# Patient Record
Sex: MALE | Race: WHITE | NOT HISPANIC OR LATINO | Employment: FULL TIME | URBAN - METROPOLITAN AREA
[De-identification: names, ages, dates, MRNs, and addresses within clinical notes are randomized per-mention and may not be internally consistent; named-entity substitution may affect disease eponyms.]

---

## 2018-09-25 ENCOUNTER — OFFICE VISIT CONVERTED (OUTPATIENT)
Dept: CARDIOLOGY | Facility: CLINIC | Age: 38
End: 2018-09-25
Attending: SPECIALIST

## 2018-10-01 ENCOUNTER — CONVERSION ENCOUNTER (OUTPATIENT)
Dept: CARDIOLOGY | Facility: CLINIC | Age: 38
End: 2018-10-01
Attending: SPECIALIST

## 2019-01-08 ENCOUNTER — CONVERSION ENCOUNTER (OUTPATIENT)
Dept: CARDIOLOGY | Facility: CLINIC | Age: 39
End: 2019-01-08
Attending: SPECIALIST

## 2019-01-28 ENCOUNTER — HOSPITAL ENCOUNTER (OUTPATIENT)
Dept: OTHER | Facility: HOSPITAL | Age: 39
Discharge: HOME | End: 2019-01-28
Attending: NURSE PRACTITIONER

## 2019-01-28 ENCOUNTER — OFFICE VISIT CONVERTED (OUTPATIENT)
Dept: INTERNAL MEDICINE | Facility: CLINIC | Age: 39
End: 2019-01-28
Attending: NURSE PRACTITIONER

## 2019-01-28 LAB
ALBUMIN SERPL-MCNC: 4.2 G/DL (ref 3.5–5)
ALBUMIN/GLOB SERPL: 1.5 {RATIO} (ref 1.4–2.6)
ALP SERPL-CCNC: 70 U/L (ref 53–128)
ALT SERPL-CCNC: 30 U/L (ref 10–40)
ANION GAP SERPL CALC-SCNC: 18 MMOL/L (ref 8–19)
AST SERPL-CCNC: 20 U/L (ref 15–50)
BASOPHILS # BLD AUTO: 0.01 10*3/UL (ref 0–0.2)
BASOPHILS NFR BLD AUTO: 0.21 % (ref 0–3)
BILIRUB SERPL-MCNC: 0.4 MG/DL (ref 0.2–1.3)
BUN SERPL-MCNC: 23 MG/DL (ref 5–25)
BUN/CREAT SERPL: 18 {RATIO} (ref 6–20)
CALCIUM SERPL-MCNC: 8.9 MG/DL (ref 8.7–10.4)
CHLORIDE SERPL-SCNC: 104 MMOL/L (ref 99–111)
CHOLEST SERPL-MCNC: 151 MG/DL (ref 107–200)
CHOLEST/HDLC SERPL: 4.6 {RATIO} (ref 3–6)
CONV CO2: 23 MMOL/L (ref 22–32)
CONV TOTAL PROTEIN: 7 G/DL (ref 6.3–8.2)
CREAT UR-MCNC: 1.28 MG/DL (ref 0.7–1.2)
EOSINOPHIL # BLD AUTO: 0.14 10*3/UL (ref 0–0.7)
EOSINOPHIL # BLD AUTO: 2.44 % (ref 0–7)
ERYTHROCYTE [DISTWIDTH] IN BLOOD BY AUTOMATED COUNT: 11.6 % (ref 11.5–14.5)
GFR SERPLBLD BASED ON 1.73 SQ M-ARVRAT: >60 ML/MIN/{1.73_M2}
GLOBULIN UR ELPH-MCNC: 2.8 G/DL (ref 2–3.5)
GLUCOSE SERPL-MCNC: 109 MG/DL (ref 70–99)
HBA1C MFR BLD: 13.6 G/DL (ref 14–18)
HCT VFR BLD AUTO: 38.2 % (ref 42–52)
HDLC SERPL-MCNC: 33 MG/DL (ref 40–60)
LDLC SERPL CALC-MCNC: 64 MG/DL (ref 70–100)
LYMPHOCYTES # BLD AUTO: 2.18 10*3/UL (ref 1–5)
MCH RBC QN AUTO: 29.8 PG (ref 27–31)
MCHC RBC AUTO-ENTMCNC: 35.7 G/DL (ref 33–37)
MCV RBC AUTO: 83.4 FL (ref 80–96)
MONOCYTES # BLD AUTO: 0.45 10*3/UL (ref 0.2–1.2)
MONOCYTES NFR BLD AUTO: 7.82 % (ref 3–10)
NEUTROPHILS # BLD AUTO: 3.03 10*3/UL (ref 2–8)
NEUTROPHILS NFR BLD AUTO: 52.1 % (ref 30–85)
NRBC BLD AUTO-RTO: 0 % (ref 0–0.01)
OSMOLALITY SERPL CALC.SUM OF ELEC: 296 MOSM/KG (ref 273–304)
PLATELET # BLD AUTO: 224 10*3/UL (ref 130–400)
PMV BLD AUTO: 8.2 FL (ref 7.4–10.4)
POTASSIUM SERPL-SCNC: 3.7 MMOL/L (ref 3.5–5.3)
RBC # BLD AUTO: 4.58 10*6/UL (ref 4.7–6.1)
SODIUM SERPL-SCNC: 141 MMOL/L (ref 135–147)
TRIGL SERPL-MCNC: 272 MG/DL (ref 40–150)
VARIANT LYMPHS NFR BLD MANUAL: 37.4 % (ref 20–45)
VLDLC SERPL-MCNC: 54 MG/DL (ref 5–37)
WBC # BLD AUTO: 5.81 10*3/UL (ref 4.8–10.8)

## 2019-02-07 ENCOUNTER — HOSPITAL ENCOUNTER (OUTPATIENT)
Dept: OTHER | Facility: HOSPITAL | Age: 39
Discharge: HOME | End: 2019-02-07
Attending: NURSE PRACTITIONER

## 2019-02-07 LAB
FERRITIN SERPL-MCNC: 247 NG/ML (ref 30–300)
FOLATE SERPL-MCNC: 14 NG/ML (ref 4.8–20)
IRON SATN MFR SERPL: 21 % (ref 20–55)
IRON SERPL-MCNC: 70 UG/DL (ref 70–180)
TIBC SERPL-MCNC: 329 UG/DL (ref 245–450)
TRANSFERRIN SERPL-MCNC: 230 MG/DL (ref 215–365)
VIT B12 SERPL-MCNC: 375 PG/ML (ref 211–911)

## 2019-04-29 ENCOUNTER — OFFICE VISIT CONVERTED (OUTPATIENT)
Dept: INTERNAL MEDICINE | Facility: CLINIC | Age: 39
End: 2019-04-29
Attending: NURSE PRACTITIONER

## 2019-04-29 ENCOUNTER — HOSPITAL ENCOUNTER (OUTPATIENT)
Dept: OTHER | Facility: HOSPITAL | Age: 39
Discharge: HOME | End: 2019-04-29
Attending: NURSE PRACTITIONER

## 2019-04-29 LAB
ALBUMIN SERPL-MCNC: 4.4 G/DL (ref 3.5–5)
ALBUMIN/GLOB SERPL: 1.5 {RATIO} (ref 1.4–2.6)
ALP SERPL-CCNC: 73 U/L (ref 53–128)
ALT SERPL-CCNC: 23 U/L (ref 10–40)
ANION GAP SERPL CALC-SCNC: 19 MMOL/L (ref 8–19)
AST SERPL-CCNC: 18 U/L (ref 15–50)
BASOPHILS # BLD AUTO: 0.06 10*3/UL (ref 0–0.2)
BASOPHILS NFR BLD AUTO: 1 % (ref 0–3)
BILIRUB SERPL-MCNC: 0.26 MG/DL (ref 0.2–1.3)
BUN SERPL-MCNC: 19 MG/DL (ref 5–25)
BUN/CREAT SERPL: 15 {RATIO} (ref 6–20)
CALCIUM SERPL-MCNC: 9.3 MG/DL (ref 8.7–10.4)
CHLORIDE SERPL-SCNC: 103 MMOL/L (ref 99–111)
CONV ABS IMM GRAN: 0.05 10*3/UL (ref 0–0.2)
CONV CO2: 23 MMOL/L (ref 22–32)
CONV IMMATURE GRAN: 0.9 % (ref 0–1.8)
CONV TOTAL PROTEIN: 7.4 G/DL (ref 6.3–8.2)
CREAT UR-MCNC: 1.28 MG/DL (ref 0.7–1.2)
DEPRECATED RDW RBC AUTO: 41.5 FL (ref 35.1–43.9)
EOSINOPHIL # BLD AUTO: 0.12 10*3/UL (ref 0–0.7)
EOSINOPHIL # BLD AUTO: 2.1 % (ref 0–7)
ERYTHROCYTE [DISTWIDTH] IN BLOOD BY AUTOMATED COUNT: 13 % (ref 11.6–14.4)
GFR SERPLBLD BASED ON 1.73 SQ M-ARVRAT: >60 ML/MIN/{1.73_M2}
GLOBULIN UR ELPH-MCNC: 3 G/DL (ref 2–3.5)
GLUCOSE SERPL-MCNC: 103 MG/DL (ref 70–99)
HBA1C MFR BLD: 13.4 G/DL (ref 14–18)
HCT VFR BLD AUTO: 42.7 % (ref 42–52)
LYMPHOCYTES # BLD AUTO: 1.52 10*3/UL (ref 1–5)
MCH RBC QN AUTO: 27.7 PG (ref 27–31)
MCHC RBC AUTO-ENTMCNC: 31.4 G/DL (ref 33–37)
MCV RBC AUTO: 88.2 FL (ref 80–96)
MONOCYTES # BLD AUTO: 0.39 10*3/UL (ref 0.2–1.2)
MONOCYTES NFR BLD AUTO: 6.7 % (ref 3–10)
NEUTROPHILS # BLD AUTO: 3.69 10*3/UL (ref 2–8)
NEUTROPHILS NFR BLD AUTO: 63.2 % (ref 30–85)
NRBC CBCN: 0 % (ref 0–0.7)
OSMOLALITY SERPL CALC.SUM OF ELEC: 293 MOSM/KG (ref 273–304)
PLATELET # BLD AUTO: 256 10*3/UL (ref 130–400)
PMV BLD AUTO: 11.1 FL (ref 9.4–12.4)
POTASSIUM SERPL-SCNC: 4.9 MMOL/L (ref 3.5–5.3)
RBC # BLD AUTO: 4.84 10*6/UL (ref 4.7–6.1)
SODIUM SERPL-SCNC: 140 MMOL/L (ref 135–147)
VARIANT LYMPHS NFR BLD MANUAL: 26.1 % (ref 20–45)
WBC # BLD AUTO: 5.83 10*3/UL (ref 4.8–10.8)

## 2019-05-06 ENCOUNTER — HOSPITAL ENCOUNTER (OUTPATIENT)
Dept: MRI IMAGING | Facility: HOSPITAL | Age: 39
Discharge: HOME | End: 2019-05-06
Attending: NURSE PRACTITIONER

## 2019-05-13 ENCOUNTER — CONVERSION ENCOUNTER (OUTPATIENT)
Dept: ORTHOPEDIC SURGERY | Facility: CLINIC | Age: 39
End: 2019-05-13

## 2019-05-13 ENCOUNTER — OFFICE VISIT CONVERTED (OUTPATIENT)
Dept: ORTHOPEDIC SURGERY | Facility: CLINIC | Age: 39
End: 2019-05-13
Attending: ORTHOPAEDIC SURGERY

## 2019-05-30 ENCOUNTER — HOSPITAL ENCOUNTER (OUTPATIENT)
Dept: OTHER | Facility: HOSPITAL | Age: 39
Discharge: HOME | End: 2019-05-30

## 2019-10-30 ENCOUNTER — OFFICE VISIT CONVERTED (OUTPATIENT)
Dept: INTERNAL MEDICINE | Facility: CLINIC | Age: 39
End: 2019-10-30
Attending: NURSE PRACTITIONER

## 2019-10-30 ENCOUNTER — HOSPITAL ENCOUNTER (OUTPATIENT)
Dept: OTHER | Facility: HOSPITAL | Age: 39
Discharge: HOME | End: 2019-10-30
Attending: NURSE PRACTITIONER

## 2019-10-30 LAB
ALBUMIN SERPL-MCNC: 4.3 G/DL (ref 3.5–5)
ALBUMIN/GLOB SERPL: 1.5 {RATIO} (ref 1.4–2.6)
ALP SERPL-CCNC: 68 U/L (ref 53–128)
ALT SERPL-CCNC: 23 U/L (ref 10–40)
ANION GAP SERPL CALC-SCNC: 17 MMOL/L (ref 8–19)
AST SERPL-CCNC: 17 U/L (ref 15–50)
BASOPHILS # BLD AUTO: 0.07 10*3/UL (ref 0–0.2)
BASOPHILS NFR BLD AUTO: 1.1 % (ref 0–3)
BILIRUB SERPL-MCNC: 0.25 MG/DL (ref 0.2–1.3)
BUN SERPL-MCNC: 14 MG/DL (ref 5–25)
BUN/CREAT SERPL: 12 {RATIO} (ref 6–20)
CALCIUM SERPL-MCNC: 9.3 MG/DL (ref 8.7–10.4)
CHLORIDE SERPL-SCNC: 103 MMOL/L (ref 99–111)
CHOLEST SERPL-MCNC: 155 MG/DL (ref 107–200)
CHOLEST/HDLC SERPL: 3.7 {RATIO} (ref 3–6)
CONV ABS IMM GRAN: 0.02 10*3/UL (ref 0–0.2)
CONV CO2: 23 MMOL/L (ref 22–32)
CONV IMMATURE GRAN: 0.3 % (ref 0–1.8)
CONV TOTAL PROTEIN: 7.2 G/DL (ref 6.3–8.2)
CREAT UR-MCNC: 1.15 MG/DL (ref 0.7–1.2)
DEPRECATED RDW RBC AUTO: 38.7 FL (ref 35.1–43.9)
EOSINOPHIL # BLD AUTO: 0.13 10*3/UL (ref 0–0.7)
EOSINOPHIL # BLD AUTO: 2 % (ref 0–7)
ERYTHROCYTE [DISTWIDTH] IN BLOOD BY AUTOMATED COUNT: 12.4 % (ref 11.6–14.4)
EST. AVERAGE GLUCOSE BLD GHB EST-MCNC: 108 MG/DL
GFR SERPLBLD BASED ON 1.73 SQ M-ARVRAT: >60 ML/MIN/{1.73_M2}
GLOBULIN UR ELPH-MCNC: 2.9 G/DL (ref 2–3.5)
GLUCOSE SERPL-MCNC: 102 MG/DL (ref 70–99)
HBA1C MFR BLD: 5.4 % (ref 3.5–5.7)
HCT VFR BLD AUTO: 42.5 % (ref 42–52)
HDLC SERPL-MCNC: 42 MG/DL (ref 40–60)
HGB BLD-MCNC: 14.4 G/DL (ref 14–18)
LDLC SERPL CALC-MCNC: 81 MG/DL (ref 70–100)
LYMPHOCYTES # BLD AUTO: 1.63 10*3/UL (ref 1–5)
LYMPHOCYTES NFR BLD AUTO: 24.7 % (ref 20–45)
MCH RBC QN AUTO: 29 PG (ref 27–31)
MCHC RBC AUTO-ENTMCNC: 33.9 G/DL (ref 33–37)
MCV RBC AUTO: 85.5 FL (ref 80–96)
MONOCYTES # BLD AUTO: 0.47 10*3/UL (ref 0.2–1.2)
MONOCYTES NFR BLD AUTO: 7.1 % (ref 3–10)
NEUTROPHILS # BLD AUTO: 4.28 10*3/UL (ref 2–8)
NEUTROPHILS NFR BLD AUTO: 64.8 % (ref 30–85)
NRBC CBCN: 0 % (ref 0–0.7)
OSMOLALITY SERPL CALC.SUM OF ELEC: 289 MOSM/KG (ref 273–304)
PLATELET # BLD AUTO: 265 10*3/UL (ref 130–400)
PMV BLD AUTO: 10.8 FL (ref 9.4–12.4)
POTASSIUM SERPL-SCNC: 4.2 MMOL/L (ref 3.5–5.3)
RBC # BLD AUTO: 4.97 10*6/UL (ref 4.7–6.1)
SODIUM SERPL-SCNC: 139 MMOL/L (ref 135–147)
T4 FREE SERPL-MCNC: 1.3 NG/DL (ref 0.9–1.8)
TRIGL SERPL-MCNC: 159 MG/DL (ref 40–150)
TSH SERPL-ACNC: 1.02 M[IU]/L (ref 0.27–4.2)
VLDLC SERPL-MCNC: 32 MG/DL (ref 5–37)
WBC # BLD AUTO: 6.6 10*3/UL (ref 4.8–10.8)

## 2019-11-12 ENCOUNTER — HOSPITAL ENCOUNTER (OUTPATIENT)
Dept: ULTRASOUND IMAGING | Facility: HOSPITAL | Age: 39
Discharge: HOME | End: 2019-11-12
Attending: NURSE PRACTITIONER

## 2019-11-19 ENCOUNTER — OFFICE VISIT CONVERTED (OUTPATIENT)
Dept: PODIATRY | Facility: CLINIC | Age: 39
End: 2019-11-19
Attending: PODIATRIST

## 2019-11-25 ENCOUNTER — OFFICE VISIT CONVERTED (OUTPATIENT)
Dept: UROLOGY | Facility: CLINIC | Age: 39
End: 2019-11-25
Attending: UROLOGY

## 2019-12-17 ENCOUNTER — HOSPITAL ENCOUNTER (OUTPATIENT)
Dept: PERIOP | Facility: HOSPITAL | Age: 39
Setting detail: HOSPITAL OUTPATIENT SURGERY
Discharge: HOME | End: 2019-12-17
Attending: UROLOGY

## 2019-12-31 ENCOUNTER — CONVERSION ENCOUNTER (OUTPATIENT)
Dept: SURGERY | Facility: CLINIC | Age: 39
End: 2019-12-31

## 2019-12-31 ENCOUNTER — OFFICE VISIT CONVERTED (OUTPATIENT)
Dept: SURGERY | Facility: CLINIC | Age: 39
End: 2019-12-31
Attending: PHYSICIAN ASSISTANT

## 2020-05-08 ENCOUNTER — TELEMEDICINE CONVERTED (OUTPATIENT)
Dept: INTERNAL MEDICINE | Facility: CLINIC | Age: 40
End: 2020-05-08
Attending: NURSE PRACTITIONER

## 2020-06-08 ENCOUNTER — TELEMEDICINE CONVERTED (OUTPATIENT)
Dept: INTERNAL MEDICINE | Facility: CLINIC | Age: 40
End: 2020-06-08
Attending: NURSE PRACTITIONER

## 2020-08-10 ENCOUNTER — HOSPITAL ENCOUNTER (OUTPATIENT)
Dept: OTHER | Facility: HOSPITAL | Age: 40
Discharge: HOME | End: 2020-08-10
Attending: NURSE PRACTITIONER

## 2020-08-10 LAB — VIT B12 SERPL-MCNC: 324 PG/ML (ref 211–911)

## 2020-08-11 LAB
25(OH)D3 SERPL-MCNC: 35.1 NG/ML (ref 30–100)
TESTOST SERPL-MCNC: 281 NG/DL (ref 249–836)
TSH SERPL-ACNC: 1.29 M[IU]/L (ref 0.27–4.2)

## 2020-08-12 LAB
CONV ANTI MICROSOMAL AB: <9 IU/ML (ref 0–34)
CONV THYROXINE TOTAL: 5.9 UG/DL (ref 4.5–12)
T3FREE SERPL-MCNC: 3.2 PG/ML (ref 2–4.4)
THYROGLOBULIN ANTIBODY: <1 IU/ML (ref 0–0.9)

## 2020-08-16 LAB — TESTOSTERONE, FREE: 4.9 PG/ML (ref 6.8–21.5)

## 2020-12-11 ENCOUNTER — HOSPITAL ENCOUNTER (OUTPATIENT)
Dept: OTHER | Facility: HOSPITAL | Age: 40
Discharge: HOME | End: 2020-12-11
Attending: STUDENT IN AN ORGANIZED HEALTH CARE EDUCATION/TRAINING PROGRAM

## 2020-12-11 ENCOUNTER — OFFICE VISIT CONVERTED (OUTPATIENT)
Dept: INTERNAL MEDICINE | Facility: CLINIC | Age: 40
End: 2020-12-11
Attending: STUDENT IN AN ORGANIZED HEALTH CARE EDUCATION/TRAINING PROGRAM

## 2020-12-15 LAB — SARS-COV-2 RNA SPEC QL NAA+PROBE: NOT DETECTED

## 2021-05-13 NOTE — PROGRESS NOTES
Progress Note      Patient Name: Saul Morfin   Patient ID: [id number]   Sex: Male   YOB: 1980    Primary Care Provider: Hina ZAVALA   Referring Provider: Hnia ZAVALA    Visit Date: June 8, 2020    Provider: SALLY Sauer   Location: [facility]   Location Address: [address] [place] [address], [address]  [place], [place]  [id number]   Location Phone: [phone]          Chief Complaint  · follow up, no concerns      History Of Present Illness  Video Conferencing Visit  Saul Morfin is a 40 year old /White male who is presenting for evaluation via video conferencing via Zoom. Verbal consent obtained before beginning visit.   The following staff were present during this visit: Angela Prabhakar RN; SALLY Sauer      Informed patient that as visit is being performed as a video conference there will be no opportunity to obtain vital signs or perform a thorough physical exam. Due to this there is unfortunately a possibility that things may be missed that would typically be noticed during a traditional visit. Patient is aware of this possibility and agrees to proceed with the video conference. Patient states there is no other person present for this video conference. Call via Zoom.    Insomnia-  Follow up for addition of trazodone. Patient states sleep has improved significantly since starting the medication. Reports at least five hours of continuous sleep time, this has increased from 1-2. Denies side effects, without morning drowsiness, agitation, confusion, diarrhea. Patient would like to continue medication at this time.       Past Medical History  Disease Name Date Onset Notes   Anemia, Unspecified --  --    Anxiety --  --    Arthritis --  --    Athletes foot --  --    Depression --  --    Foot pain, left --  --    Forgetfulness --  --    Heart murmur --  --    Heel pain --  --    Hydrocele of testis, RT 10/30/2019 --    Ingrown toenail --  --    Left foot pain  10/30/2019 --    Psychiatric Care --  --    Rupture of plantar fascia of left foot, subsequent encounter 10/30/2019 --    Testicular pain, right 10/30/2019 --          Past Surgical History  Procedure Name Date Notes   Hip Surgery 2017 --    Hydrocele Repair --  --    Shoulder surgery --  --          Medication List  Name Date Started Instructions   bupropion HCl 150 mg oral tablet extended release 24 hr 10/30/2019 take 3 tablets by oral route once a day (in the morning) for 90 days   buspirone 10 mg oral tablet 10/30/2019 take 1 tablet (10 mg) by oral route 2 times per day for 90 days   prazosin 2 mg oral capsule 02/13/2020 take 1 capsule (2 mg) by oral route at bedtime   sildenafil 100 mg oral tablet 05/08/2020 take 1 tablet (100 mg) by oral route once daily as needed approximately 1 hour before sexual activity   trazodone 50 mg oral tablet 05/08/2020 take 1 tablet (50 mg) by oral route once daily at bedtime   venlafaxine 75 mg oral tablet 06/08/2020 take 1 tablet (75 mg) by oral route 2 times per day with food         Allergy List  Allergen Name Date Reaction Notes   NO KNOWN DRUG ALLERGIES --  --  --        Allergies Reconciled  Family Medical History  Disease Name Relative/Age Notes   Family history of cancer  --    Family history of diabetes mellitus  --          Social History  Finding Status Start/Stop Quantity Notes   Alcohol Current some day --/-- --  --    Alcohol Use Current some day --/-- --  occasionally drinks, less than 1 drink per day, has been drinking for 21-30 years   lives with children --  --/-- --  --    lives with spouse --  --/-- --  --    . --  --/-- --  --    Recreational Drug Use Former --/-- --  in the past   Tobacco Never --/-- --  never smoker   Working --  --/-- --  --          Immunizations  NameDate Admin Mfg Trade Name Lot Number Route Inj VIS Given VIS Publication   [id number]/01/2019 SKB Fluarix, quadrivalent, preservative free [id number] NE NE 10/30/2019    Comments:           Review of Systems  · Constitutional  o Denies  o : fever, fatigue, weight loss, weight gain  · Cardiovascular  o Denies  o : lower extremity edema, chest pressure, palpitations  · Respiratory  o Denies  o : shortness of breath, wheezing, cough, dyspnea on exertion  · Gastrointestinal  o Denies  o : nausea, vomiting, diarrhea, constipation, abdominal pain  · Psychiatric  o Admits  o : anxiety, depression, difficulty sleeping  o Denies  o : suicidal ideation, homicidal ideation      Physical Examination     General: Well nourished, no acute distress  HENT: Atraumatic, normocephalic  Eyes: Extraocular movements intact, no scleral icterus  Lungs: Breathing comfortably, without cough  Integumentary: No visible rash or lesion  Neurologic: Grossly oriented to person, place, time; without facial droop  Psych: Normal mood and affect               Assessment  · Insomnia     780.52/G47.00  Well controlled, continue trazodone. Patient to continue to monitor for potential side effects, will call or return to clinic with concerns. Discussed potential for serotonin syndrome in combination with other medications, patient voices understanding. Follow up in 6 months, sooner if concerns arise.    Problems Reconciled  Plan  · Orders  o ACO-39: Current medications updated and reviewed () - - 06/08/2020  · Medications  o trazodone 50 mg oral tablet   SIG: take 1 tablet (50 mg) by oral route once daily at bedtime   DISP: (90) tablets with 1 refills  Adjusted on 06/08/2020     o Medications have been Reconciled  o Transition of Care or Provider Policy  · Instructions  o Take all medications as prescribed/directed.  o Patient was educated/instructed on their diagnosis, treatment and medications prior to discharge from the clinic today.  o Patient instructed to seek medical attention urgently for new or worsening symptoms.  o Call the office with any concerns or questions.  o Risks, benefits, and alternatives were discussed with  the patient. The patient is aware of risks associated with: serotonin syndrome  · Disposition  o Call or Return if symptoms worsen or persist.  o Follow up in 6 months  o Prescriptions sent to pharmacy            Electronically Signed by: SALLY Sauer -Author on June 8, 2020 11:09:49 AM

## 2021-05-13 NOTE — PROGRESS NOTES
"   Progress Note      Patient Name: Saul Morfin   Patient ID: [id number]   Sex: Male   YOB: 1980    Primary Care Provider: Hina ZAVALA   Referring Provider: Hina ZAVALA    Visit Date: December 11, 2020    Provider: Domonique Hunt MD   Location: [facility]   Location Address: [address] [place] [address] [address]  [place], [place]  [id number]   Location Phone: [phone]          Chief Complaint  · \"headache,body aches, very tired,nasuea\"      History Of Present Illness  Saul Morfin is a 40 year old /White male who presents for evaluation and treatment of:      Fatigue:   Abrupt onset a couple days ago.   Endorses cough, non-productive as well as nausea and body aches.   States he felt like he had the flu and desires testing for Covid.   Denies diarrhea, fever or chills.       Past Medical History  Disease Name Date Onset Notes   Anemia, Unspecified --  --    Anxiety --  --    Arthritis --  --    Athletes foot --  --    Depression --  --    Foot pain, left --  --    Forgetfulness --  --    Heart murmur --  --    Heel pain --  --    Hydrocele of testis, RT 10/30/2019 --    Ingrown toenail --  --    Left foot pain 10/30/2019 --    Psychiatric Care --  --    Rupture of plantar fascia of left foot, subsequent encounter 10/30/2019 --    Testicular pain, right 10/30/2019 --          Past Surgical History  Procedure Name Date Notes   Hip Surgery 2017 --    Hydrocele Repair --  --    Shoulder surgery --  --          Medication List  Name Date Started Instructions   bupropion HCl 150 mg oral tablet extended release 24 hr 10/30/2019 take 3 tablets by oral route once a day (in the morning) for 90 days   buspirone 10 mg oral tablet 10/30/2019 take 1 tablet (10 mg) by oral route 2 times per day for 90 days   prazosin 2 mg oral capsule 02/13/2020 take 1 capsule (2 mg) by oral route at bedtime   sildenafil 100 mg oral tablet 05/08/2020 take 1 tablet (100 mg) by oral route once " "daily as needed approximately 1 hour before sexual activity   trazodone 50 mg oral tablet 06/08/2020 take 1 tablet (50 mg) by oral route once daily at bedtime   venlafaxine 75 mg oral tablet 06/08/2020 take 1 tablet (75 mg) by oral route 2 times per day with food   Vitamin D3 50 mcg (2,000 unit) oral tablet 08/19/2020 take 1 tablet by oral route once a day (in the morning)         Allergy List  Allergen Name Date Reaction Notes   NO KNOWN DRUG ALLERGIES --  --  --        Allergies Reconciled  Family Medical History  Disease Name Relative/Age Notes   Family history of cancer  --    Family history of diabetes mellitus  --          Social History  Finding Status Start/Stop Quantity Notes   Alcohol Current some day --/-- --  --    Alcohol Use Current some day --/-- --  occasionally drinks, less than 1 drink per day, has been drinking for 21-30 years   lives with children --  --/-- --  --    lives with spouse --  --/-- --  --    . --  --/-- --  --    Recreational Drug Use Former --/-- --  in the past   Tobacco Never --/-- --  never smoker   Working --  --/-- --  --          Immunizations  NameDate Admin Mfg Trade Name Lot Number Route Inj VIS Given VIS Publication   [id number]/01/2019 SKB Fluarix, quadrivalent, preservative free [id number] NE NE 10/30/2019    Comments:          Review of Systems  · Constitutional  o Denies  o : fever, fatigue, weight loss, weight gain  · Cardiovascular  o Denies  o : lower extremity edema, claudication, chest pressure, palpitations  · Respiratory  o Admits  o : dry cough  o Denies  o : shortness of breath  · Gastrointestinal  o Admits  o : nausea  o Denies  o : vomiting, diarrhea, constipation, abdominal pain  · Musculoskeletal  o Denies  o : muscle aches      Vitals  Date Time BP Position Site L\R Cuff Size HR RR TEMP (F) WT  HT  BMI kg/m2 BSA m2 O2 Sat FR L/min FiO2 HC       11/25/2019 09:29 AM       16  261lbs 8oz 5'  11\" 36.47 2.44       12/31/2019 09:06 AM       16  253lbs 2oz 5' " " 11\" 35.3 2.4       12/11/2020 11:58 /70 Sitting    82 - R  97.9 293lbs 16oz 5'  11\" 41 2.58 95 %            Physical Examination  · Constitutional  o Appearance  o : no acute distress, well-nourished  · Head and Face  o Head  o :   § Inspection  § : atraumatic, normocephalic  · Ears, Nose, Mouth and Throat  o Ears  o :   § External Ears  § : normal  o Nose  o :   § Intranasal Exam  § : nares patent  o Oral Cavity  o :   § Oral Mucosa  § : moist mucous membranes  o Throat  o :   § Oropharynx  § : no inflammation or lesions present, tonsils within normal limits  · Respiratory  o Respiratory Effort  o : breathing comfortably, symmetric chest rise  o Auscultation of Lungs  o : clear to asculatation bilaterally, no wheezes, rales, or rhonchii  · Cardiovascular  o Heart  o :   § Auscultation of Heart  § : regular rate and rhythm, no murmurs, rubs, or gallops  o Peripheral Vascular System  o :   § Extremities  § : no edema          Assessment  · Fatigue     780.79/R53.83  With mild cough and body aches. Covid testing in office, will call pt with result. Recommend self-quarantine until results are obtained.  · Body aches     780.96/R52    Problems Reconciled  Plan  · Orders  o ACO-39: Current medications updated and reviewed (1159F, ) - 780.79/R53.83, 780.96/R52 - 12/11/2020  o [facility] NCOV2 (send-out) (59629) - 780.79/R53.83 - 12/11/2020  · Medications  o Medications have been Reconciled  o Transition of Care or Provider Policy  · Instructions  o Patient was educated/instructed on their diagnosis, treatment and medications prior to discharge from the clinic today.  o Discussed Covid-19 precautions including, but not limited to, social distancing, avoid touching your face, and hand washing.   · Disposition  o Call or Return if symptoms worsen or persist.            Electronically Signed by: Domonique Hunt MD -Author on February 15, 2021 07:55:43 PM  "

## 2021-05-13 NOTE — PROGRESS NOTES
Progress Note      Patient Name: Saul Morfin   Patient ID: [id number]   Sex: Male   YOB: 1980    Primary Care Provider: Hina ZAVALA   Referring Provider: Hina ZAVALA    Visit Date: May 8, 2020    Provider: SALLY Sauer   Location: [facility]   Location Address: [address] [place] [address], [address]  [place], [place]  [id number]   Location Phone: [phone]          Chief Complaint  · follow up  · trouble sleeping      History Of Present Illness  Video Conferencing Visit  Saul Morfin is a 39 year old /White male who is presenting for evaluation via video conferencing. Verbal consent obtained before beginning visit.   The following staff were present during this visit: Domonique Loyd MA; SALLY Sauer      Informed patient that as visit is being performed as a video conference there will be no opportunity to obtain vital signs or perform a thorough physical exam. Due to this there is unfortunately a possibility that things may be missed that would typically be noticed during a traditional visit. Patient is aware of this possibility and agrees to proceed with the video conference. Patient states there is no other person present for this video conference. Call via Zoom.    Anxiety/depression-  Currently managed with venlafaxine, Wellbutrin, buspirone. Patient reports symptoms well controlled, without concern at this time. He does not currently see a therapist. Denies SI/HI.    Insomnia-  Patient reports difficulty staying asleep. Will fall asleep without issue but only sleep for 1-3 hours at a time. Patient currently wears a CPAP, states this did improve his sleep. Patient was on Ambien in the past which was helpful. Has also tried melatonin and Benadryl without relief.         Past Medical History  Disease Name Date Onset Notes   Anemia, Unspecified --  --    Anxiety --  --    Arthritis --  --    Athletes foot --  --    Depression --  --    Foot pain, left  --  --    Forgetfulness --  --    Heart murmur --  --    Heel pain --  --    Hydrocele of testis, RT 10/30/2019 --    Ingrown toenail --  --    Left foot pain 10/30/2019 --    Psychiatric Care --  --    Rupture of plantar fascia of left foot, subsequent encounter 10/30/2019 --    Testicular pain, right 10/30/2019 --          Past Surgical History  Procedure Name Date Notes   Hip Surgery 2017 --    Hydrocele Repair --  --    Shoulder surgery --  --          Medication List  Name Date Started Instructions   bupropion HCl 150 mg oral tablet extended release 24 hr 10/30/2019 take 3 tablets by oral route once a day (in the morning) for 90 days   buspirone 10 mg oral tablet 10/30/2019 take 1 tablet (10 mg) by oral route 2 times per day for 90 days   naproxen 500 mg oral tablet 01/28/2019 take 1 tablet (500 mg) by oral route 2 times per day with food for 30 days   prazosin 2 mg oral capsule 02/13/2020 take 1 capsule (2 mg) by oral route at bedtime   sildenafil 100 mg oral tablet 05/08/2020 take 1 tablet (100 mg) by oral route once daily as needed approximately 1 hour before sexual activity   venlafaxine 37.5 mg oral capsule,extended release 24hr 10/30/2019 take 1 capsule (37.5 mg) by oral route once daily with food         Allergy List  Allergen Name Date Reaction Notes   NO KNOWN DRUG ALLERGIES --  --  --          Family Medical History  Disease Name Relative/Age Notes   Family history of cancer  --    Family history of diabetes mellitus  --          Social History  Finding Status Start/Stop Quantity Notes   Alcohol Current some day --/-- --  --    Alcohol Use Current some day --/-- --  occasionally drinks, less than 1 drink per day, has been drinking for 21-30 years   lives with children --  --/-- --  --    lives with spouse --  --/-- --  --    . --  --/-- --  --    Recreational Drug Use Former --/-- --  in the past   Tobacco Never --/-- --  never smoker   Working --  --/-- --  --          Immunizations  NameDate  Admin Mfg Trade Name Lot Number Route Inj VIS Given VIS Publication   [id number]/01/2019 SKB Fluarix, quadrivalent, preservative free [id number] NE NE 10/30/2019    Comments:          Review of Systems  · Constitutional  o Denies  o : fever, fatigue, weight loss, weight gain  · Cardiovascular  o Denies  o : lower extremity edema, chest pressure, palpitations  · Respiratory  o Denies  o : shortness of breath, wheezing, frequent cough, dyspnea on exertion  · Gastrointestinal  o Denies  o : nausea, vomiting, diarrhea, constipation, abdominal pain  · Psychiatric  o Admits  o : anxiety, depression, difficulty sleeping  o Denies  o : suicidal ideation, homicidal ideation      Physical Examination     General: Well nourished, no acute distress  HENT: Atraumatic, normocephalic  Eyes: Extraocular movements intact, no scleral icterus  Lungs: Breathing comfortably, without cough  Integumentary: No visible rash or lesion  Neurologic: Grossly oriented to person, place, time; without facial droop  Psych: Normal mood and affect               Assessment  · Depression     311/F32.9  · Anxiety     300.02/F41.1  Anxiety/depression well controlled per patient. Continue Effexor, Wellbutrin, Buspar. Encouraged patient to continue to monitor and to seek medical attention immediately if he feels that his mental health is deteriorating. Denies SI/HI. Will continue to monitor.  · Insomnia     780.52/G47.00  Discussed sleep hygiene interventions, including developing a bedtime routine, avoiding sources of blue light prior to bedtime, avoiding daytime naps, reducing caffeine intake, journaling thoughts and using the bed only for sleep. Will trial low dose trazodone at this time and follow up in one month to assess medication effectiveness, sooner if concerns arise. Monitor for signs of serotonin syndrome due to combination use with SSRI. Patient aware of black box warning of increased risk for suicidal ideations.     Problems  Reconciled  Plan  · Orders  o ACO-39: Current medications updated and reviewed () - - 05/08/2020  · Medications  o trazodone 50 mg oral tablet   SIG: take 1 tablet (50 mg) by oral route once daily at bedtime   DISP: (30) tablets with 1 refills  Prescribed on 05/08/2020     o sildenafil 100 mg oral tablet   SIG: take 1 tablet (100 mg) by oral route once daily as needed approximately 1 hour before sexual activity   DISP: (9) tablets with 1 refills  Refilled on 05/08/2020     o Medications have been Reconciled  o Transition of Care or Provider Policy  · Instructions  o Take all medications as prescribed/directed.  o Patient was educated/instructed on their diagnosis, treatment and medications prior to discharge from the clinic today.  o Patient instructed to seek medical attention urgently for new or worsening symptoms.  o Call the office with any concerns or questions.  o Risks, benefits, and alternatives were discussed with the patient. The patient is aware of risks associated with: trazodone, SSRIs, serotonin syndrome, increased risk of suicidal ideations  · Disposition  o Call or Return if symptoms worsen or persist.  o Follow up in 1 month  o Prescriptions sent to pharmacy            Electronically Signed by: SALLY Sauer -Author on May 8, 2020 12:03:50 PM

## 2021-05-14 VITALS
OXYGEN SATURATION: 95 % | HEIGHT: 71 IN | BODY MASS INDEX: 41.16 KG/M2 | HEART RATE: 82 BPM | DIASTOLIC BLOOD PRESSURE: 70 MMHG | SYSTOLIC BLOOD PRESSURE: 120 MMHG | WEIGHT: 294 LBS | TEMPERATURE: 97.9 F

## 2021-05-15 VITALS
BODY MASS INDEX: 36.82 KG/M2 | OXYGEN SATURATION: 98 % | TEMPERATURE: 98.1 F | HEART RATE: 86 BPM | WEIGHT: 263 LBS | RESPIRATION RATE: 14 BRPM | SYSTOLIC BLOOD PRESSURE: 116 MMHG | DIASTOLIC BLOOD PRESSURE: 70 MMHG | HEIGHT: 71 IN

## 2021-05-15 VITALS
OXYGEN SATURATION: 98 % | TEMPERATURE: 96.7 F | BODY MASS INDEX: 37.69 KG/M2 | DIASTOLIC BLOOD PRESSURE: 78 MMHG | RESPIRATION RATE: 16 BRPM | WEIGHT: 269.25 LBS | HEART RATE: 67 BPM | SYSTOLIC BLOOD PRESSURE: 128 MMHG | HEIGHT: 71 IN

## 2021-05-15 VITALS
DIASTOLIC BLOOD PRESSURE: 80 MMHG | HEIGHT: 71 IN | OXYGEN SATURATION: 97 % | BODY MASS INDEX: 36.96 KG/M2 | WEIGHT: 264 LBS | HEART RATE: 71 BPM | SYSTOLIC BLOOD PRESSURE: 147 MMHG

## 2021-05-15 VITALS — HEIGHT: 71 IN | RESPIRATION RATE: 16 BRPM | BODY MASS INDEX: 36.61 KG/M2 | WEIGHT: 261.5 LBS

## 2021-05-15 VITALS — WEIGHT: 253.12 LBS | BODY MASS INDEX: 35.44 KG/M2 | RESPIRATION RATE: 16 BRPM | HEIGHT: 71 IN

## 2021-05-15 VITALS
RESPIRATION RATE: 15 BRPM | HEART RATE: 84 BPM | OXYGEN SATURATION: 96 % | SYSTOLIC BLOOD PRESSURE: 118 MMHG | HEIGHT: 71 IN | BODY MASS INDEX: 39.2 KG/M2 | TEMPERATURE: 97.5 F | DIASTOLIC BLOOD PRESSURE: 64 MMHG | WEIGHT: 280 LBS

## 2021-05-15 VITALS — WEIGHT: 259.5 LBS | OXYGEN SATURATION: 99 % | BODY MASS INDEX: 36.33 KG/M2 | HEART RATE: 87 BPM | HEIGHT: 71 IN

## 2021-05-16 VITALS
HEIGHT: 71 IN | BODY MASS INDEX: 37.66 KG/M2 | DIASTOLIC BLOOD PRESSURE: 90 MMHG | HEART RATE: 88 BPM | SYSTOLIC BLOOD PRESSURE: 148 MMHG | WEIGHT: 269 LBS

## 2021-06-23 DIAGNOSIS — F41.9 ANXIETY: Primary | ICD-10-CM

## 2021-06-23 DIAGNOSIS — F32.A DEPRESSION, UNSPECIFIED DEPRESSION TYPE: ICD-10-CM

## 2021-06-23 RX ORDER — VENLAFAXINE 75 MG/1
75 TABLET ORAL DAILY
COMMUNITY
Start: 2021-03-08 | End: 2021-06-23 | Stop reason: SDUPTHER

## 2021-06-23 NOTE — TELEPHONE ENCOUNTER
Last Seen:  12/11/2020  Needing a refill on:    Buproprion 150mg 3 tablets Qday  Venlafaxine 75mg  Buspirone 10mg  Vitamin D3 50mcg (2,000 Unit)    Didn't order buproprion 3 tablets daily wasn't sure if correct and that dosing.  Hasn't had it filled since 2019 from [facility]    Buspirone hasn't been filled since 2019 either.     [facility] patient.

## 2021-06-23 NOTE — TELEPHONE ENCOUNTER
Can we find out if she was getting the buproprion prescribed by someone else? Or maybe she had enough refills to last her through last year?

## 2021-06-24 PROBLEM — R01.1 HEART MURMUR: Status: ACTIVE | Noted: 2021-06-24

## 2021-06-24 PROBLEM — S93.692A RUPTURE OF PLANTAR FASCIA OF LEFT FOOT: Status: ACTIVE | Noted: 2019-10-30

## 2021-06-24 PROBLEM — Z86.59 PERSONAL HISTORY OF MENTAL DISORDER: Status: ACTIVE | Noted: 2021-06-24

## 2021-06-24 PROBLEM — N50.819 PAIN IN TESTICLE: Status: ACTIVE | Noted: 2019-10-30

## 2021-06-24 PROBLEM — F41.9 ANXIETY: Status: ACTIVE | Noted: 2021-06-24

## 2021-06-24 PROBLEM — B35.3 ATHLETES FOOT: Status: ACTIVE | Noted: 2021-06-24

## 2021-06-24 PROBLEM — L60.0 INGROWN TOENAIL: Status: ACTIVE | Noted: 2021-06-24

## 2021-06-24 PROBLEM — M79.673 PAIN OF FOOT: Status: ACTIVE | Noted: 2019-10-30

## 2021-06-24 PROBLEM — F32.A DEPRESSION: Status: ACTIVE | Noted: 2021-06-24

## 2021-06-24 PROBLEM — D64.9 ANEMIA: Status: ACTIVE | Noted: 2021-06-24

## 2021-06-24 PROBLEM — N43.3 HYDROCELE OF TESTIS: Status: ACTIVE | Noted: 2019-10-30

## 2021-06-24 PROBLEM — M19.90 ARTHRITIS: Status: ACTIVE | Noted: 2021-06-24

## 2021-06-24 PROBLEM — R68.89 FORGETFULNESS: Status: ACTIVE | Noted: 2021-06-24

## 2021-06-24 RX ORDER — CHOLECALCIFEROL (VITAMIN D3) 125 MCG
2000 CAPSULE ORAL DAILY
Qty: 30 TABLET | Refills: 3 | Status: SHIPPED | OUTPATIENT
Start: 2021-06-24 | End: 2021-11-02 | Stop reason: SDUPTHER

## 2021-06-24 RX ORDER — BUPROPION HYDROCHLORIDE 150 MG/1
150 TABLET, EXTENDED RELEASE ORAL 3 TIMES DAILY
Qty: 90 TABLET | Refills: 3 | Status: SHIPPED | OUTPATIENT
Start: 2021-06-24 | End: 2021-11-02 | Stop reason: SDUPTHER

## 2021-06-24 RX ORDER — SILDENAFIL 100 MG/1
TABLET, FILM COATED ORAL
COMMUNITY
Start: 2021-03-08 | End: 2022-07-19 | Stop reason: SDUPTHER

## 2021-06-24 RX ORDER — TRAZODONE HYDROCHLORIDE 50 MG/1
TABLET ORAL
COMMUNITY
Start: 2021-03-08 | End: 2021-06-28 | Stop reason: SDUPTHER

## 2021-06-24 RX ORDER — VENLAFAXINE 75 MG/1
75 TABLET ORAL 2 TIMES DAILY
Qty: 180 TABLET | Refills: 0 | Status: SHIPPED | OUTPATIENT
Start: 2021-06-24 | End: 2021-06-28 | Stop reason: SDUPTHER

## 2021-06-24 RX ORDER — BUSPIRONE HYDROCHLORIDE 10 MG/1
10 TABLET ORAL 2 TIMES DAILY
Qty: 60 TABLET | Refills: 2 | Status: SHIPPED | OUTPATIENT
Start: 2021-06-24 | End: 2021-11-02 | Stop reason: SDUPTHER

## 2021-06-28 DIAGNOSIS — F41.9 ANXIETY: ICD-10-CM

## 2021-06-28 DIAGNOSIS — F32.A DEPRESSION, UNSPECIFIED DEPRESSION TYPE: ICD-10-CM

## 2021-06-28 RX ORDER — TRAZODONE HYDROCHLORIDE 50 MG/1
50 TABLET ORAL NIGHTLY
Qty: 22 TABLET | Refills: 0 | Status: SHIPPED | OUTPATIENT
Start: 2021-06-28 | End: 2021-08-17

## 2021-06-28 RX ORDER — BUPROPION HYDROCHLORIDE 150 MG/1
150 TABLET, EXTENDED RELEASE ORAL 3 TIMES DAILY
Qty: 90 TABLET | Refills: 3 | OUTPATIENT
Start: 2021-06-28 | End: 2021-07-28

## 2021-06-28 RX ORDER — VENLAFAXINE 75 MG/1
75 TABLET ORAL 2 TIMES DAILY
Qty: 44 TABLET | Refills: 0 | Status: SHIPPED | OUTPATIENT
Start: 2021-06-28 | End: 2021-08-09 | Stop reason: SDUPTHER

## 2021-06-28 RX ORDER — BUSPIRONE HYDROCHLORIDE 10 MG/1
10 TABLET ORAL 2 TIMES DAILY
Qty: 60 TABLET | Refills: 2 | OUTPATIENT
Start: 2021-06-28 | End: 2021-07-28

## 2021-07-19 ENCOUNTER — OFFICE VISIT (OUTPATIENT)
Dept: INTERNAL MEDICINE | Facility: CLINIC | Age: 41
End: 2021-07-19

## 2021-07-19 VITALS
TEMPERATURE: 97.4 F | DIASTOLIC BLOOD PRESSURE: 68 MMHG | SYSTOLIC BLOOD PRESSURE: 130 MMHG | HEIGHT: 71 IN | OXYGEN SATURATION: 97 % | HEART RATE: 72 BPM | WEIGHT: 291.4 LBS | BODY MASS INDEX: 40.8 KG/M2

## 2021-07-19 DIAGNOSIS — E55.9 VITAMIN D DEFICIENCY: ICD-10-CM

## 2021-07-19 DIAGNOSIS — N28.9 ABNORMAL KIDNEY FUNCTION: ICD-10-CM

## 2021-07-19 DIAGNOSIS — R03.0 ELEVATED BLOOD PRESSURE READING WITHOUT DIAGNOSIS OF HYPERTENSION: ICD-10-CM

## 2021-07-19 DIAGNOSIS — Z13.220 SCREENING FOR CHOLESTEROL LEVEL: ICD-10-CM

## 2021-07-19 DIAGNOSIS — F33.1 MAJOR DEPRESSIVE DISORDER, RECURRENT EPISODE, MODERATE DEGREE (HCC): Primary | ICD-10-CM

## 2021-07-19 DIAGNOSIS — Z13.29 SCREENING FOR THYROID DISORDER: ICD-10-CM

## 2021-07-19 DIAGNOSIS — F41.1 GENERALIZED ANXIETY DISORDER: ICD-10-CM

## 2021-07-19 DIAGNOSIS — F31.0 BIPOLAR AFFECTIVE DISORDER, CURRENT EPISODE HYPOMANIC (HCC): ICD-10-CM

## 2021-07-19 DIAGNOSIS — R73.9 HYPERGLYCEMIA: ICD-10-CM

## 2021-07-19 DIAGNOSIS — E53.8 B12 DEFICIENCY: ICD-10-CM

## 2021-07-19 PROBLEM — F32.A DEPRESSION: Status: RESOLVED | Noted: 2021-06-24 | Resolved: 2021-07-19

## 2021-07-19 PROBLEM — S93.692A RUPTURE OF PLANTAR FASCIA OF LEFT FOOT: Status: RESOLVED | Noted: 2019-10-30 | Resolved: 2021-07-19

## 2021-07-19 PROBLEM — M79.673 PAIN OF FOOT: Status: RESOLVED | Noted: 2019-10-30 | Resolved: 2021-07-19

## 2021-07-19 PROBLEM — F41.9 ANXIETY: Status: RESOLVED | Noted: 2021-06-24 | Resolved: 2021-07-19

## 2021-07-19 PROBLEM — N43.3 HYDROCELE OF TESTIS: Status: RESOLVED | Noted: 2019-10-30 | Resolved: 2021-07-19

## 2021-07-19 PROBLEM — R68.89 FORGETFULNESS: Status: RESOLVED | Noted: 2021-06-24 | Resolved: 2021-07-19

## 2021-07-19 PROBLEM — N50.819 PAIN IN TESTICLE: Status: RESOLVED | Noted: 2019-10-30 | Resolved: 2021-07-19

## 2021-07-19 PROBLEM — Z86.59 PERSONAL HISTORY OF MENTAL DISORDER: Status: RESOLVED | Noted: 2021-06-24 | Resolved: 2021-07-19

## 2021-07-19 LAB
25(OH)D3 SERPL-MCNC: 43.1 NG/ML (ref 30–100)
ALBUMIN SERPL-MCNC: 4.4 G/DL (ref 3.5–5.2)
ALBUMIN/GLOB SERPL: 1.6 G/DL
ALP SERPL-CCNC: 64 U/L (ref 39–117)
ALT SERPL W P-5'-P-CCNC: 25 U/L (ref 1–41)
ANION GAP SERPL CALCULATED.3IONS-SCNC: 9.4 MMOL/L (ref 5–15)
AST SERPL-CCNC: 20 U/L (ref 1–40)
BASOPHILS # BLD AUTO: 0.06 10*3/MM3 (ref 0–0.2)
BASOPHILS NFR BLD AUTO: 1 % (ref 0–1.5)
BILIRUB SERPL-MCNC: 0.4 MG/DL (ref 0–1.2)
BUN SERPL-MCNC: 25 MG/DL (ref 6–20)
BUN/CREAT SERPL: 14.5 (ref 7–25)
CALCIUM SPEC-SCNC: 9 MG/DL (ref 8.6–10.5)
CHLORIDE SERPL-SCNC: 104 MMOL/L (ref 98–107)
CHOLEST SERPL-MCNC: 165 MG/DL (ref 0–200)
CO2 SERPL-SCNC: 24.6 MMOL/L (ref 22–29)
CREAT SERPL-MCNC: 1.73 MG/DL (ref 0.76–1.27)
DEPRECATED RDW RBC AUTO: 41 FL (ref 37–54)
EOSINOPHIL # BLD AUTO: 0.08 10*3/MM3 (ref 0–0.4)
EOSINOPHIL NFR BLD AUTO: 1.4 % (ref 0.3–6.2)
ERYTHROCYTE [DISTWIDTH] IN BLOOD BY AUTOMATED COUNT: 13.1 % (ref 12.3–15.4)
GFR SERPL CREATININE-BSD FRML MDRD: 43 ML/MIN/1.73
GLOBULIN UR ELPH-MCNC: 2.7 GM/DL
GLUCOSE SERPL-MCNC: 103 MG/DL (ref 65–99)
HCT VFR BLD AUTO: 44 % (ref 37.5–51)
HDLC SERPL-MCNC: 31 MG/DL (ref 40–60)
HGB BLD-MCNC: 14.2 G/DL (ref 13–17.7)
IMM GRANULOCYTES # BLD AUTO: 0.02 10*3/MM3 (ref 0–0.05)
IMM GRANULOCYTES NFR BLD AUTO: 0.3 % (ref 0–0.5)
LDLC SERPL CALC-MCNC: 92 MG/DL (ref 0–100)
LDLC/HDLC SERPL: 2.71 {RATIO}
LYMPHOCYTES # BLD AUTO: 1.68 10*3/MM3 (ref 0.7–3.1)
LYMPHOCYTES NFR BLD AUTO: 28.7 % (ref 19.6–45.3)
MCH RBC QN AUTO: 28.1 PG (ref 26.6–33)
MCHC RBC AUTO-ENTMCNC: 32.3 G/DL (ref 31.5–35.7)
MCV RBC AUTO: 87 FL (ref 79–97)
MONOCYTES # BLD AUTO: 0.37 10*3/MM3 (ref 0.1–0.9)
MONOCYTES NFR BLD AUTO: 6.3 % (ref 5–12)
NEUTROPHILS NFR BLD AUTO: 3.65 10*3/MM3 (ref 1.7–7)
NEUTROPHILS NFR BLD AUTO: 62.3 % (ref 42.7–76)
NRBC BLD AUTO-RTO: 0 /100 WBC (ref 0–0.2)
PLATELET # BLD AUTO: 274 10*3/MM3 (ref 140–450)
PMV BLD AUTO: 10.9 FL (ref 6–12)
POTASSIUM SERPL-SCNC: 4.6 MMOL/L (ref 3.5–5.2)
PROT SERPL-MCNC: 7.1 G/DL (ref 6–8.5)
RBC # BLD AUTO: 5.06 10*6/MM3 (ref 4.14–5.8)
SODIUM SERPL-SCNC: 138 MMOL/L (ref 136–145)
TRIGL SERPL-MCNC: 250 MG/DL (ref 0–150)
TSH SERPL DL<=0.05 MIU/L-ACNC: 0.78 UIU/ML (ref 0.27–4.2)
VIT B12 BLD-MCNC: 308 PG/ML (ref 211–946)
VLDLC SERPL-MCNC: 42 MG/DL (ref 5–40)
WBC # BLD AUTO: 5.86 10*3/MM3 (ref 3.4–10.8)

## 2021-07-19 PROCEDURE — 83036 HEMOGLOBIN GLYCOSYLATED A1C: CPT | Performed by: PHYSICIAN ASSISTANT

## 2021-07-19 PROCEDURE — 82306 VITAMIN D 25 HYDROXY: CPT | Performed by: PHYSICIAN ASSISTANT

## 2021-07-19 PROCEDURE — 36415 COLL VENOUS BLD VENIPUNCTURE: CPT | Performed by: PHYSICIAN ASSISTANT

## 2021-07-19 PROCEDURE — 80053 COMPREHEN METABOLIC PANEL: CPT | Performed by: PHYSICIAN ASSISTANT

## 2021-07-19 PROCEDURE — 85025 COMPLETE CBC W/AUTO DIFF WBC: CPT | Performed by: PHYSICIAN ASSISTANT

## 2021-07-19 PROCEDURE — 84443 ASSAY THYROID STIM HORMONE: CPT | Performed by: PHYSICIAN ASSISTANT

## 2021-07-19 PROCEDURE — 80061 LIPID PANEL: CPT | Performed by: PHYSICIAN ASSISTANT

## 2021-07-19 PROCEDURE — 82607 VITAMIN B-12: CPT | Performed by: PHYSICIAN ASSISTANT

## 2021-07-19 PROCEDURE — 99214 OFFICE O/P EST MOD 30 MIN: CPT | Performed by: PHYSICIAN ASSISTANT

## 2021-07-19 NOTE — PROGRESS NOTES
Chief Complaint  Follow-up (Medication refill) and Manic Behavior    Subjective          Saul Morfin presents to [facility]  Pt admits to drastic mood changes and irritability.   Admits to excessive spending 2x/month  He feels invincible 2x/month, drives motocycle quickly.   Denies staying awake days at a time. Denies previous diagnosis of bipolar disorder.  Denies feeling down or sad with current medicine. If he misses a pill then he gets down again.  Feels worried 2x/wk.   Insomnia: taking trazodone. He has issues falling asleep. Will only sleep a few hours at a time.  Denies si/hi   Denies cp, palpitations, ha, dizziness  Pt has tried counseling in the past but did not feel it helped. States he did not talk to counselor about manic like symptoms  Not interested in counseling at this time.      Past Medical History:   Diagnosis Date   • Anemia    • Anxiety    • Arthritis    • Athletes foot    • Depression    • Foot pain, left    • Forgetfulness    • H/O psychiatric care    • Heart murmur    • Heel pain    • Hydrocele of testis 10/30/2019    RIGHT    • Ingrown toenail    • Left foot pain 10/30/2019   • Rupture of plantar fascia of left foot 10/30/2019   • Testicular pain 10/30/2019        Past Surgical History:   Procedure Laterality Date   • HIP SURGERY  2017   • HYDROCELE EXCISION / REPAIR     • SHOULDER SURGERY          Current Outpatient Medications on File Prior to Visit   Medication Sig Dispense Refill   • buPROPion SR (Wellbutrin SR) 150 MG 12 hr tablet Take 1 tablet by mouth 3 (Three) Times a Day for 30 days. 90 tablet 3   • busPIRone (BUSPAR) 10 MG tablet Take 1 tablet by mouth 2 (two) times a day for 30 days. 60 tablet 2   • Cholecalciferol (Vitamin D3) 50 MCG (2000 UT) tablet Take 2,000 Units by mouth Daily. 30 tablet 3   • sildenafil (VIAGRA) 100 MG tablet sildenafil 100 mg oral tablet take 1 tablet (100 mg) by oral route once daily as needed  "approximately 1 hour before sexual activity 3/8/2021  Active     • traZODone (DESYREL) 50 MG tablet Take 1 tablet by mouth Every Night for 22 days. 22 tablet 0   • venlafaxine (EFFEXOR) 75 MG tablet Take 1 tablet by mouth 2 (Two) Times a Day. 44 tablet 0     No current facility-administered medications on file prior to visit.        No Known Allergies    Social History     Tobacco Use   Smoking Status Never Smoker   Smokeless Tobacco Never Used          Objective   Vital Signs:   /68   Pulse 72   Temp 97.4 °F (36.3 °C)   Ht 180.3 cm (70.98\")   Wt 132 kg (291 lb 6.4 oz)   SpO2 97%   BMI 40.66 kg/m²     Physical Exam  Vitals reviewed.   Constitutional:       Appearance: Normal appearance.   HENT:      Head: Normocephalic and atraumatic.      Nose: Nose normal.      Mouth/Throat:      Mouth: Mucous membranes are moist.   Eyes:      Extraocular Movements: Extraocular movements intact.      Conjunctiva/sclera: Conjunctivae normal.      Pupils: Pupils are equal, round, and reactive to light.   Cardiovascular:      Rate and Rhythm: Normal rate and regular rhythm.   Pulmonary:      Effort: Pulmonary effort is normal.      Breath sounds: Normal breath sounds.   Abdominal:      General: Abdomen is flat. Bowel sounds are normal.      Palpations: Abdomen is soft.   Musculoskeletal:         General: Normal range of motion.   Neurological:      General: No focal deficit present.      Mental Status: He is alert and oriented to person, place, and time.   Psychiatric:         Mood and Affect: Mood normal.        Result Review :                 Assessment and Plan    Diagnoses and all orders for this visit:    1. Major depressive disorder, recurrent episode, moderate degree (CMS/HCC) (Primary)  -     Comprehensive Metabolic Panel  -     CBC & Differential  -     TSH  -     Lipid Panel    2. B12 deficiency  Assessment & Plan:  Rechecking b12 level today    Orders:  -     Vitamin B12    3. Vitamin D deficiency  Assessment & " Plan:  Lab today    Orders:  -     Vitamin D 25 hydroxy    4. Bipolar affective disorder, current episode hypomanic (CMS/HCC)  Assessment & Plan:  Discussed bipolar disorder. Discussed manic symptoms and risks associated with srinath such as danger to self or others and poor insight and decision making. Discussed numerous medications and treatment options with pt. Will start Zyprexa today. Start 1/2 tab daily x 1 wk then 1 tab daily .  Discussed pt needs to go to ER with any symptoms of worsening srinath- hallucinations, excessive spending, inability to sleep for days at a time, feeling invincible. Encouraged pt to set up appt for counseling as well. Patient understands and agrees    Orders:  -     Comprehensive Metabolic Panel  -     CBC & Differential  -     TSH  -     Lipid Panel    5. Generalized anxiety disorder  -     Comprehensive Metabolic Panel  -     CBC & Differential  -     TSH    6. Screening for cholesterol level  -     Lipid Panel    7. Screening for thyroid disorder  -     TSH    8. Elevated blood pressure reading without diagnosis of hypertension  Comments:  Discussed bp elevation, will monitor at follow up visit.      Follow Up   Return in about 1 month (around 8/19/2021).  Patient was given instructions and counseling regarding his condition or for health maintenance advice. Please see specific information pulled into the AVS if appropriate.

## 2021-07-19 NOTE — ASSESSMENT & PLAN NOTE
Discussed bipolar disorder. Discussed manic symptoms and risks associated with srinath such as danger to self or others and poor insight and decision making. Discussed numerous medications and treatment options with pt. Will start Zyprexa today. Start 1/2 tab daily x 1 wk then 1 tab daily .  Discussed pt needs to go to ER with any symptoms of worsening srinath- hallucinations, excessive spending, inability to sleep for days at a time, feeling invincible. Encouraged pt to set up appt for counseling as well. Patient understands and agrees

## 2021-07-20 LAB — HBA1C MFR BLD: 5.4 % (ref 4.8–5.6)

## 2021-07-22 ENCOUNTER — TELEPHONE (OUTPATIENT)
Dept: INTERNAL MEDICINE | Facility: CLINIC | Age: 41
End: 2021-07-22

## 2021-07-22 RX ORDER — OLANZAPINE 5 MG/1
5 TABLET ORAL NIGHTLY
Qty: 30 TABLET | Refills: 1 | Status: SHIPPED | OUTPATIENT
Start: 2021-07-22 | End: 2021-08-09

## 2021-07-22 NOTE — TELEPHONE ENCOUNTER
Caller: CAPRI    Relationship: Spouse    Best call back number: [phone]    Medication needed:   MOOD STABILIZER, TAKEN AT NIGHT     What additional details did the patient provide when requesting the medication: PATIENTS WIFE STATED HE WAS TAKEN OFF OF TRAZODONE AND WAS GIVEN A NEW MEDICATION TO TAKE. PHARMACY STILL HAS NOT RECEIVED THE MEDICATION.    LAST SEEN ON 07/19     Does the patient have less than a 3 day supply:  [x] Yes  [] No    What is the patient's preferred pharmacy: [facility] -  [place] - [address] [facility] - [phone] [facility] [phone]

## 2021-07-22 NOTE — TELEPHONE ENCOUNTER
Zyprexa was not sent into the pharmacy . Patient called stating it was not at [facility]. Still want to prescribe?  If you can you please send into pharmacy. Please advise. TY

## 2021-08-09 DIAGNOSIS — F41.9 ANXIETY: ICD-10-CM

## 2021-08-09 DIAGNOSIS — F32.A DEPRESSION, UNSPECIFIED DEPRESSION TYPE: ICD-10-CM

## 2021-08-09 RX ORDER — VENLAFAXINE 75 MG/1
75 TABLET ORAL 2 TIMES DAILY
Qty: 60 TABLET | Refills: 1 | Status: SHIPPED | OUTPATIENT
Start: 2021-08-09 | End: 2021-08-11

## 2021-08-09 RX ORDER — ARIPIPRAZOLE 2 MG/1
2 TABLET ORAL DAILY
Qty: 30 TABLET | Refills: 0 | Status: SHIPPED | OUTPATIENT
Start: 2021-08-09 | End: 2021-11-02 | Stop reason: SDUPTHER

## 2021-08-09 NOTE — TELEPHONE ENCOUNTER
Patient states he is out of med.  Also, sent you a my chart message about another medication that he wants to change.  Has appt with Hina on 8/17 and rosemarie on 8/23.  Does patient need both appts?

## 2021-08-09 NOTE — TELEPHONE ENCOUNTER
Please make sure taking both zyprexa and effexor. Can cancel my follow up, keep upcoming apt with Hina

## 2021-08-10 NOTE — TELEPHONE ENCOUNTER
PATIENTS WIFE CALLED AND HE IS OUT OF MEDICATION AND IS NEEDING THEM REFILLED. PATIENT WAS IN THE BACK GROUND VERY UPSET.

## 2021-08-10 NOTE — TELEPHONE ENCOUNTER
PATIENTS WIFE ASK THAT THE PRESCRIPTION BE SENT TO THE [facility] IN [place] TODAY OR FIRST THING IN THE MORNING.

## 2021-08-11 RX ORDER — VENLAFAXINE 75 MG/1
75 TABLET ORAL 2 TIMES DAILY
Qty: 60 TABLET | Refills: 1 | Status: SHIPPED | OUTPATIENT
Start: 2021-08-11 | End: 2021-11-02 | Stop reason: SDUPTHER

## 2021-08-11 NOTE — TELEPHONE ENCOUNTER
Spoke with patient and he confirmed understanding to take zyprexa and effexor.  Resent med to [facility] since it was sent to [facility] by mistake., cancelled appt with Tali patient aware.

## 2021-08-14 ENCOUNTER — HOSPITAL ENCOUNTER (EMERGENCY)
Facility: HOSPITAL | Age: 41
Discharge: HOME | End: 2021-08-14
Attending: EMERGENCY MEDICINE | Admitting: EMERGENCY MEDICINE

## 2021-08-14 ENCOUNTER — APPOINTMENT (OUTPATIENT)
Dept: GENERAL RADIOLOGY | Facility: HOSPITAL | Age: 41
End: 2021-08-14

## 2021-08-14 VITALS
HEIGHT: 71 IN | HEART RATE: 120 BPM | OXYGEN SATURATION: 97 % | DIASTOLIC BLOOD PRESSURE: 95 MMHG | WEIGHT: 306 LBS | SYSTOLIC BLOOD PRESSURE: 153 MMHG | RESPIRATION RATE: 18 BRPM | BODY MASS INDEX: 42.84 KG/M2 | TEMPERATURE: 98.3 F

## 2021-08-14 DIAGNOSIS — S83.92XA SPRAIN OF LEFT KNEE, UNSPECIFIED LIGAMENT, INITIAL ENCOUNTER: Primary | ICD-10-CM

## 2021-08-14 PROCEDURE — 99282 EMERGENCY DEPT VISIT SF MDM: CPT

## 2021-08-14 PROCEDURE — 73562 X-RAY EXAM OF KNEE 3: CPT

## 2021-08-15 NOTE — ED PROVIDER NOTES
Subjective     Knee Pain  Location:  Knee  Knee location:  L knee  Pain details:     Quality:  Aching    Radiates to:  Does not radiate    Severity:  Moderate    Duration:  1 day    Progression:  Unchanged  Chronicity:  New  Dislocation: no    Prior injury to area:  No  Relieved by:  Nothing  Worsened by:  Bearing weight  Ineffective treatments:  Rest  Associated symptoms: decreased ROM    Associated symptoms: no back pain, no fatigue, no fever, no itching, no muscle weakness, no numbness, no stiffness, no swelling and no tingling    Risk factors: no concern for non-accidental trauma, no known bone disorder, no obesity and no recent illness        Review of Systems   Constitutional: Negative for chills, fatigue and fever.   HENT: Negative for congestion, ear pain and sore throat.    Eyes: Negative for pain.   Respiratory: Negative for cough, chest tightness and shortness of breath.    Cardiovascular: Negative for chest pain.   Gastrointestinal: Negative for abdominal pain, diarrhea, nausea and vomiting.   Genitourinary: Negative for flank pain and hematuria.   Musculoskeletal: Positive for arthralgias. Negative for back pain, joint swelling and stiffness.   Skin: Negative for itching and pallor.   Neurological: Negative for seizures and headaches.   All other systems reviewed and are negative.      Past Medical History:   Diagnosis Date   • Anemia    • Anxiety    • Anxiety    • Arthritis    • Athletes foot    • Depression    • Foot pain, left    • Forgetfulness    • H/O psychiatric care    • Heart murmur    • Heel pain    • Hydrocele of testis 10/30/2019    RIGHT    • Ingrown toenail    • Left foot pain 10/30/2019   • PTSD (post-traumatic stress disorder)    • Rupture of plantar fascia of left foot 10/30/2019   • Testicular pain 10/30/2019       No Known Allergies    Past Surgical History:   Procedure Laterality Date   • HIP SURGERY  2017   • HYDROCELE EXCISION / REPAIR     • SHOULDER SURGERY         Family History    Problem Relation Age of Onset   • Cancer Other    • Diabetes Other        Social History     Socioeconomic History   • Marital status:      Spouse name: Not on file   • Number of children: Not on file   • Years of education: Not on file   • Highest education level: Not on file   Tobacco Use   • Smoking status: Never Smoker   • Smokeless tobacco: Never Used   Vaping Use   • Vaping Use: Never used   Substance and Sexual Activity   • Alcohol use: Yes     Comment: OCCASIONALLY DRINKS, LESS THAN 1 DRINKS PER DAY, HAS BEEN DRINKING FOR 21-30 YEARS    • Drug use: Not Currently     Comment: IN THE PAST            Objective   Physical Exam  Vitals and nursing note reviewed.   Constitutional:       General: He is not in acute distress.     Appearance: Normal appearance. He is not toxic-appearing.   HENT:      Head: Normocephalic and atraumatic.      Mouth/Throat:      Mouth: Mucous membranes are moist.   Eyes:      Extraocular Movements: Extraocular movements intact.      Pupils: Pupils are equal, round, and reactive to light.   Cardiovascular:      Rate and Rhythm: Normal rate and regular rhythm.      Pulses: Normal pulses.      Heart sounds: Normal heart sounds.   Pulmonary:      Effort: Pulmonary effort is normal. No respiratory distress.      Breath sounds: Normal breath sounds.   Abdominal:      General: Abdomen is flat.      Palpations: Abdomen is soft.      Tenderness: There is no abdominal tenderness.   Musculoskeletal:         General: Tenderness present. No swelling. Normal range of motion.      Cervical back: Normal range of motion and neck supple.      Comments: Left knee pain and tenderness.  No bony abnormalities felt are noted.  No open wounds or abrasions.   Skin:     General: Skin is warm and dry.   Neurological:      Mental Status: He is alert and oriented to person, place, and time. Mental status is at baseline.         Procedures           ED Course                                            MDM  Number of Diagnoses or Management Options  Sprain of left knee, unspecified ligament, initial encounter: minor     Amount and/or Complexity of Data Reviewed  Tests in the radiology section of CPT®: reviewed    Risk of Complications, Morbidity, and/or Mortality  Presenting problems: low  Diagnostic procedures: low  Management options: low        Final diagnoses:   Sprain of left knee, unspecified ligament, initial encounter       ED Disposition  ED Disposition     ED Disposition Condition Comment    Discharge Stable           Link Hawkins MD  [address] [place], [place] [id number]  [phone]               Medication List      No changes were made to your prescriptions during this visit.          Alexys Waterman, APRN  08/16/21 1442

## 2021-08-16 ENCOUNTER — TELEPHONE (OUTPATIENT)
Dept: INTERNAL MEDICINE | Facility: CLINIC | Age: 41
End: 2021-08-16

## 2021-08-16 ENCOUNTER — TELEPHONE (OUTPATIENT)
Dept: ORTHOPEDIC SURGERY | Facility: CLINIC | Age: 41
End: 2021-08-16

## 2021-08-16 NOTE — TELEPHONE ENCOUNTER
Caller: Saul Morfin    Relationship: Self    Best call back number:[phone]     What is the medical concern/diagnosis: KNEE     What specialty or service is being requested: REFERRAL TO A ORTHOPEDIC SURGEON    What is the provider, practice or medical service name:   What is the office location:  What is the office phone number:    Any additional details: PATIENT WAS SEEN IN THE EMERGENCY ROOM ON 08/14/2021 AND MESSED HIS KNEES UP AND HE IS NEEDING A REFERRAL TO AN ORTHOPEDIC SURGEON.

## 2021-08-17 ENCOUNTER — OFFICE VISIT (OUTPATIENT)
Dept: INTERNAL MEDICINE | Facility: CLINIC | Age: 41
End: 2021-08-17

## 2021-08-17 VITALS
HEIGHT: 71 IN | BODY MASS INDEX: 42.7 KG/M2 | SYSTOLIC BLOOD PRESSURE: 162 MMHG | TEMPERATURE: 98.4 F | DIASTOLIC BLOOD PRESSURE: 98 MMHG | HEART RATE: 82 BPM | OXYGEN SATURATION: 98 % | WEIGHT: 305 LBS

## 2021-08-17 DIAGNOSIS — N28.9 ABNORMAL KIDNEY FUNCTION: ICD-10-CM

## 2021-08-17 DIAGNOSIS — S89.92XA KNEE INJURY, LEFT, INITIAL ENCOUNTER: Primary | ICD-10-CM

## 2021-08-17 LAB
ANION GAP SERPL CALCULATED.3IONS-SCNC: 9.8 MMOL/L (ref 5–15)
BUN SERPL-MCNC: 17 MG/DL (ref 6–20)
BUN/CREAT SERPL: 14.2 (ref 7–25)
CALCIUM SPEC-SCNC: 8.9 MG/DL (ref 8.6–10.5)
CHLORIDE SERPL-SCNC: 105 MMOL/L (ref 98–107)
CO2 SERPL-SCNC: 25.2 MMOL/L (ref 22–29)
CREAT SERPL-MCNC: 1.2 MG/DL (ref 0.76–1.27)
GFR SERPL CREATININE-BSD FRML MDRD: 66 ML/MIN/1.73
GLUCOSE SERPL-MCNC: 95 MG/DL (ref 65–99)
POTASSIUM SERPL-SCNC: 4.2 MMOL/L (ref 3.5–5.2)
SODIUM SERPL-SCNC: 140 MMOL/L (ref 136–145)

## 2021-08-17 PROCEDURE — 36415 COLL VENOUS BLD VENIPUNCTURE: CPT | Performed by: NURSE PRACTITIONER

## 2021-08-17 PROCEDURE — 99213 OFFICE O/P EST LOW 20 MIN: CPT | Performed by: NURSE PRACTITIONER

## 2021-08-17 PROCEDURE — 80048 BASIC METABOLIC PNL TOTAL CA: CPT | Performed by: PHYSICIAN ASSISTANT

## 2021-08-17 RX ORDER — ASPIRIN 81 MG/1
81 TABLET ORAL DAILY
COMMUNITY
End: 2021-08-20 | Stop reason: HOSPADM

## 2021-08-17 RX ORDER — MELATONIN
1000 DAILY
COMMUNITY
Start: 2021-07-26 | End: 2021-08-19

## 2021-08-17 RX ORDER — OLANZAPINE 5 MG/1
5 TABLET ORAL NIGHTLY
COMMUNITY
End: 2021-11-03 | Stop reason: CLARIF
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
## 2021-08-17 NOTE — ASSESSMENT & PLAN NOTE
Reviewed normal imaging from the ED. Patient with significant swelling and inability to bear weight, will schedule for MRI and refer to orthopedics for further evaluation. Brace provided in clinic. Patient to continue to ambulate with crutches. Ice/heat, NSAIDs. Patient declines prescription NSAIDs or Toradol injection at this time. He will seek medical attention immediately with severe/persistent pain, weakness, numbness/tingling. Will continue to monitor.

## 2021-08-19 ENCOUNTER — HOSPITAL ENCOUNTER (OUTPATIENT)
Dept: MRI IMAGING | Facility: HOSPITAL | Age: 41
Discharge: HOME | End: 2021-08-19
Admitting: ORTHOPAEDIC SURGERY

## 2021-08-19 ENCOUNTER — PREP FOR SURGERY (OUTPATIENT)
Dept: OTHER | Facility: HOSPITAL | Age: 41
End: 2021-08-19

## 2021-08-19 ENCOUNTER — ANESTHESIA EVENT (OUTPATIENT)
Dept: PERIOP | Facility: HOSPITAL | Age: 41
End: 2021-08-19

## 2021-08-19 ENCOUNTER — OFFICE VISIT (OUTPATIENT)
Dept: ORTHOPEDIC SURGERY | Facility: CLINIC | Age: 41
End: 2021-08-19

## 2021-08-19 VITALS — HEIGHT: 71 IN | BODY MASS INDEX: 41.58 KG/M2 | WEIGHT: 297 LBS | RESPIRATION RATE: 14 BRPM

## 2021-08-19 DIAGNOSIS — S76.112A QUADRICEPS TENDON RUPTURE, LEFT, INITIAL ENCOUNTER: Primary | ICD-10-CM

## 2021-08-19 DIAGNOSIS — S76.112A QUADRICEPS TENDON RUPTURE, LEFT, INITIAL ENCOUNTER: ICD-10-CM

## 2021-08-19 PROCEDURE — 99214 OFFICE O/P EST MOD 30 MIN: CPT | Performed by: ORTHOPAEDIC SURGERY

## 2021-08-19 PROCEDURE — 73721 MRI JNT OF LWR EXTRE W/O DYE: CPT

## 2021-08-19 PROCEDURE — 73721 MRI JNT OF LWR EXTRE W/O DYE: CPT | Performed by: RADIOLOGY

## 2021-08-19 RX ORDER — CEFAZOLIN SODIUM IN 0.9 % NACL 3 G/100 ML
3 INTRAVENOUS SOLUTION, PIGGYBACK (ML) INTRAVENOUS ONCE
Status: CANCELLED | OUTPATIENT
Start: 2021-08-19 | End: 2021-08-19
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
## 2021-08-19 NOTE — PRE-PROCEDURE INSTRUCTIONS
Patient instructed to have no food past midnight, clears up to 2 hours prior to arrival time.  Patient instructed to take am meds with small sip of water.  Patient instructed to shower with surgical soap am of surgery and wear no lotions, jewelry or piercing's day of surgery.  Patient verbalized understanding.

## 2021-08-19 NOTE — PROGRESS NOTES
"Chief Complaint  Pain of the Left Knee     Subjective      Saul Morfin presents to [facility] for evaluation of the left knee. The patient fell in a hole resulting in a left knee injury. He was seen in the ER and evaluated with x-rays he was given on crutches. He was given a brace by his PCP. He reports swelling after the injury. He felt a grind and pop. He can not raise his leg. He has no other injuries.     No Known Allergies     Social History     Socioeconomic History   • Marital status:      Spouse name: Not on file   • Number of children: Not on file   • Years of education: Not on file   • Highest education level: Not on file   Tobacco Use   • Smoking status: Never Smoker   • Smokeless tobacco: Never Used   Vaping Use   • Vaping Use: Never used   Substance and Sexual Activity   • Alcohol use: Yes     Comment: OCCASIONALLY DRINKS, LESS THAN 1 DRINKS PER DAY, HAS BEEN DRINKING FOR 21-30 YEARS    • Drug use: Not Currently     Comment: IN THE PAST         Review of Systems     Objective   Vital Signs:   Resp 14   Ht 180.3 cm (71\")   Wt 135 kg (297 lb)   BMI 41.42 kg/m²       Physical Exam  Constitutional:       Appearance: Normal appearance. He is well-developed and normal weight.   HENT:      Head: Normocephalic.      Right Ear: Hearing and external ear normal.      Left Ear: Hearing and external ear normal.      Nose: Nose normal.   Eyes:      Conjunctiva/sclera: Conjunctivae normal.   Cardiovascular:      Rate and Rhythm: Normal rate.   Pulmonary:      Effort: Pulmonary effort is normal.      Breath sounds: No wheezing or rales.   Abdominal:      Palpations: Abdomen is soft.      Tenderness: There is no abdominal tenderness.   Musculoskeletal:      Cervical back: Normal range of motion.   Skin:     Findings: No rash.   Neurological:      Mental Status: He is alert and oriented to person, place, and time.   Psychiatric:         Mood and Affect: Mood and affect normal.  "        Judgment: Judgment normal.       Ortho Exam      Left knee- can not hold a straight leg raise. 1+ effusion. palpable gap at superior patella. Unable to actively extend the knee. Knee is grossly stable. Generalized tenderness. Neurovascularly intact. Positive EHL, FHL, GS and TA. Sensation intact to all 5 nerves of the foot. Positive pulses.     Procedures      Imaging Results (Most Recent)     None           Result Review :       XR Knee 3 View Left    Result Date: 8/14/2021  Narrative: PROCEDURE: XR KNEE 3 VW LEFT  COMPARISON: None  INDICATIONS: stepped in hole, twisting knee  FINDINGS:  There is no evidence of an acute fracture.  There is a small amount of fluid in the suprapatellar bursa.  There are no lytic or destructive bony lesions.  There is fragmentation anterior tibial tuberosity.  CONCLUSION: Small joint effusion.  No acute bony abnormality.      EMILI HUANG MD       Electronically Signed and Approved By: EMILI HUANG MD on 8/14/2021 at 23:33                      Assessment and Plan     DX: Quad tendon tear, left knee injury    Discussed the treatment plan with the patient.  Plan for a STAT MRI of the left knee to rule-out a quad tendon tear. Discussed the risks and benefits of a Left knee Quadriceps tendon repair. The patient expressed understanding and wished to proceed. Knee immobilizer given today.     Discussed surgery., Risks/benefits discussed with patient including, but not limited to: infection, bleeding, neurovascular damage, malunion, nonunion, aesthetic deformity, need for further surgery, and death., Discussed with patient the implant type being used during surgery and patient understands and desires to proceed., Surgery pamphlet given. and Call or return if worsening symptoms.    Follow Up     2 weeks postoperatively.       Patient was given instructions and counseling regarding his condition or for health maintenance advice. Please see specific information pulled into the AVS if  appropriate.     Scribed for Luis Miguel Amezcua MD by Cristiane Cid.  08/19/21   09:27 EDT    I have personally performed the services described in this document as scribed by the above individual and it is both accurate and complete. Luis Miguel Amezcua MD 08/20/21

## 2021-08-20 ENCOUNTER — ANESTHESIA (OUTPATIENT)
Dept: PERIOP | Facility: HOSPITAL | Age: 41
End: 2021-08-20

## 2021-08-20 ENCOUNTER — HOSPITAL ENCOUNTER (OUTPATIENT)
Facility: HOSPITAL | Age: 41
Setting detail: HOSPITAL OUTPATIENT SURGERY
Discharge: HOME | End: 2021-08-20
Attending: ORTHOPAEDIC SURGERY | Admitting: ORTHOPAEDIC SURGERY

## 2021-08-20 VITALS
HEART RATE: 80 BPM | WEIGHT: 306.44 LBS | BODY MASS INDEX: 42.74 KG/M2 | TEMPERATURE: 97.7 F | SYSTOLIC BLOOD PRESSURE: 146 MMHG | DIASTOLIC BLOOD PRESSURE: 72 MMHG | OXYGEN SATURATION: 96 % | RESPIRATION RATE: 18 BRPM

## 2021-08-20 DIAGNOSIS — S76.112A QUADRICEPS TENDON RUPTURE, LEFT, INITIAL ENCOUNTER: ICD-10-CM

## 2021-08-20 PROCEDURE — L1830 KO IMMOB CANVAS LONG PRE OTS: HCPCS | Performed by: ORTHOPAEDIC SURGERY

## 2021-08-20 PROCEDURE — 76942 ECHO GUIDE FOR BIOPSY: CPT | Performed by: ORTHOPAEDIC SURGERY

## 2021-08-20 PROCEDURE — 25010000002 ONDANSETRON PER 1 MG: Performed by: NURSE ANESTHETIST, CERTIFIED REGISTERED

## 2021-08-20 PROCEDURE — 27385 REPAIR OF THIGH MUSCLE: CPT | Performed by: ORTHOPAEDIC SURGERY

## 2021-08-20 PROCEDURE — 25010000002 PROPOFOL 10 MG/ML EMULSION: Performed by: NURSE ANESTHETIST, CERTIFIED REGISTERED

## 2021-08-20 PROCEDURE — 25010000002 HYDROMORPHONE PER 4 MG: Performed by: NURSE ANESTHETIST, CERTIFIED REGISTERED

## 2021-08-20 PROCEDURE — 25010000002 DEXAMETHASONE PER 1 MG: Performed by: NURSE ANESTHETIST, CERTIFIED REGISTERED

## 2021-08-20 PROCEDURE — C1889 IMPLANT/INSERT DEVICE, NOC: HCPCS | Performed by: ORTHOPAEDIC SURGERY

## 2021-08-20 PROCEDURE — 25010000002 MIDAZOLAM PER 1MG: Performed by: ANESTHESIOLOGY

## 2021-08-20 PROCEDURE — L0464 TLSO 4MOD SACRO-SCAP PRE: HCPCS | Performed by: ORTHOPAEDIC SURGERY

## 2021-08-20 PROCEDURE — 25010000002 FENTANYL CITRATE (PF) 50 MCG/ML SOLUTION: Performed by: NURSE ANESTHETIST, CERTIFIED REGISTERED

## 2021-08-20 RX ORDER — OXYCODONE AND ACETAMINOPHEN 7.5; 325 MG/1; MG/1
1 TABLET ORAL EVERY 4 HOURS PRN
Qty: 30 TABLET | Refills: 0 | Status: SHIPPED | OUTPATIENT
Start: 2021-08-20 | End: 2021-08-25 | Stop reason: SDUPTHER

## 2021-08-20 RX ORDER — PHENYLEPHRINE HCL IN 0.9% NACL 1 MG/10 ML
SYRINGE (ML) INTRAVENOUS AS NEEDED
Status: DISCONTINUED | OUTPATIENT
Start: 2021-08-20 | End: 2021-08-20 | Stop reason: SURG

## 2021-08-20 RX ORDER — ONDANSETRON 2 MG/ML
4 INJECTION INTRAMUSCULAR; INTRAVENOUS ONCE AS NEEDED
Status: DISCONTINUED | OUTPATIENT
Start: 2021-08-20 | End: 2021-08-20 | Stop reason: HOSPADM

## 2021-08-20 RX ORDER — MEPERIDINE HYDROCHLORIDE 25 MG/ML
12.5 INJECTION INTRAMUSCULAR; INTRAVENOUS; SUBCUTANEOUS
Status: DISCONTINUED | OUTPATIENT
Start: 2021-08-20 | End: 2021-08-20 | Stop reason: HOSPADM

## 2021-08-20 RX ORDER — CEFAZOLIN SODIUM IN 0.9 % NACL 3 G/100 ML
3 INTRAVENOUS SOLUTION, PIGGYBACK (ML) INTRAVENOUS ONCE
Status: COMPLETED | OUTPATIENT
Start: 2021-08-20 | End: 2021-08-20

## 2021-08-20 RX ORDER — PROPOFOL 10 MG/ML
VIAL (ML) INTRAVENOUS AS NEEDED
Status: DISCONTINUED | OUTPATIENT
Start: 2021-08-20 | End: 2021-08-20 | Stop reason: SURG

## 2021-08-20 RX ORDER — DEXAMETHASONE SODIUM PHOSPHATE 4 MG/ML
INJECTION, SOLUTION INTRA-ARTICULAR; INTRALESIONAL; INTRAMUSCULAR; INTRAVENOUS; SOFT TISSUE AS NEEDED
Status: DISCONTINUED | OUTPATIENT
Start: 2021-08-20 | End: 2021-08-20 | Stop reason: SURG

## 2021-08-20 RX ORDER — MIDAZOLAM HYDROCHLORIDE 2 MG/2ML
4 INJECTION, SOLUTION INTRAMUSCULAR; INTRAVENOUS ONCE
Status: COMPLETED | OUTPATIENT
Start: 2021-08-20 | End: 2021-08-20

## 2021-08-20 RX ORDER — DEXMEDETOMIDINE HYDROCHLORIDE 100 UG/ML
INJECTION, SOLUTION INTRAVENOUS AS NEEDED
Status: DISCONTINUED | OUTPATIENT
Start: 2021-08-20 | End: 2021-08-20 | Stop reason: SURG

## 2021-08-20 RX ORDER — HYDROMORPHONE HCL 110MG/55ML
PATIENT CONTROLLED ANALGESIA SYRINGE INTRAVENOUS AS NEEDED
Status: DISCONTINUED | OUTPATIENT
Start: 2021-08-20 | End: 2021-08-20 | Stop reason: SURG

## 2021-08-20 RX ORDER — SODIUM CHLORIDE, SODIUM LACTATE, POTASSIUM CHLORIDE, CALCIUM CHLORIDE 600; 310; 30; 20 MG/100ML; MG/100ML; MG/100ML; MG/100ML
9 INJECTION, SOLUTION INTRAVENOUS CONTINUOUS PRN
Status: DISCONTINUED | OUTPATIENT
Start: 2021-08-20 | End: 2021-08-20 | Stop reason: HOSPADM

## 2021-08-20 RX ORDER — LIDOCAINE HYDROCHLORIDE 20 MG/ML
INJECTION, SOLUTION INFILTRATION; PERINEURAL AS NEEDED
Status: DISCONTINUED | OUTPATIENT
Start: 2021-08-20 | End: 2021-08-20 | Stop reason: SURG

## 2021-08-20 RX ORDER — ONDANSETRON 2 MG/ML
INJECTION INTRAMUSCULAR; INTRAVENOUS AS NEEDED
Status: DISCONTINUED | OUTPATIENT
Start: 2021-08-20 | End: 2021-08-20 | Stop reason: SURG

## 2021-08-20 RX ORDER — PROMETHAZINE HYDROCHLORIDE 12.5 MG/1
25 TABLET ORAL ONCE AS NEEDED
Status: DISCONTINUED | OUTPATIENT
Start: 2021-08-20 | End: 2021-08-20 | Stop reason: HOSPADM

## 2021-08-20 RX ORDER — BUPIVACAINE HYDROCHLORIDE AND EPINEPHRINE 5; 5 MG/ML; UG/ML
INJECTION, SOLUTION EPIDURAL; INTRACAUDAL; PERINEURAL
Status: COMPLETED | OUTPATIENT
Start: 2021-08-20 | End: 2021-08-20

## 2021-08-20 RX ORDER — PROMETHAZINE HYDROCHLORIDE 25 MG/1
25 SUPPOSITORY RECTAL ONCE AS NEEDED
Status: DISCONTINUED | OUTPATIENT
Start: 2021-08-20 | End: 2021-08-20 | Stop reason: HOSPADM

## 2021-08-20 RX ORDER — OXYCODONE HYDROCHLORIDE 5 MG/1
5 TABLET ORAL
Status: DISCONTINUED | OUTPATIENT
Start: 2021-08-20 | End: 2021-08-20 | Stop reason: HOSPADM

## 2021-08-20 RX ORDER — ACETAMINOPHEN 500 MG
1000 TABLET ORAL ONCE
Status: COMPLETED | OUTPATIENT
Start: 2021-08-20 | End: 2021-08-20

## 2021-08-20 RX ORDER — FENTANYL CITRATE 50 UG/ML
INJECTION, SOLUTION INTRAMUSCULAR; INTRAVENOUS AS NEEDED
Status: DISCONTINUED | OUTPATIENT
Start: 2021-08-20 | End: 2021-08-20 | Stop reason: SURG

## 2021-08-20 RX ORDER — EPHEDRINE SULFATE 50 MG/ML
INJECTION, SOLUTION INTRAVENOUS AS NEEDED
Status: DISCONTINUED | OUTPATIENT
Start: 2021-08-20 | End: 2021-08-20 | Stop reason: SURG

## 2021-08-20 RX ADMIN — SODIUM CHLORIDE, POTASSIUM CHLORIDE, SODIUM LACTATE AND CALCIUM CHLORIDE: 600; 310; 30; 20 INJECTION, SOLUTION INTRAVENOUS at 15:26

## 2021-08-20 RX ADMIN — PROPOFOL 200 MG: 10 INJECTION, EMULSION INTRAVENOUS at 14:48

## 2021-08-20 RX ADMIN — BUPIVACAINE HYDROCHLORIDE AND EPINEPHRINE BITARTRATE 30 ML: 5; .005 INJECTION, SOLUTION EPIDURAL; INTRACAUDAL; PERINEURAL at 13:22

## 2021-08-20 RX ADMIN — LIDOCAINE HYDROCHLORIDE 100 MG: 20 INJECTION, SOLUTION INFILTRATION; PERINEURAL at 14:48

## 2021-08-20 RX ADMIN — Medication 100 MCG: at 15:45

## 2021-08-20 RX ADMIN — Medication 100 MCG: at 15:43

## 2021-08-20 RX ADMIN — EPHEDRINE SULFATE 5 MG: 50 INJECTION INTRAVENOUS at 15:22

## 2021-08-20 RX ADMIN — DEXMEDETOMIDINE HYDROCHLORIDE 5 MCG: 100 INJECTION, SOLUTION INTRAVENOUS at 15:34

## 2021-08-20 RX ADMIN — DEXMEDETOMIDINE HYDROCHLORIDE 5 MCG: 100 INJECTION, SOLUTION INTRAVENOUS at 15:23

## 2021-08-20 RX ADMIN — FENTANYL CITRATE 50 MCG: 50 INJECTION INTRAMUSCULAR; INTRAVENOUS at 14:50

## 2021-08-20 RX ADMIN — Medication 100 MCG: at 15:48

## 2021-08-20 RX ADMIN — DEXAMETHASONE SODIUM PHOSPHATE 8 MG: 4 INJECTION INTRA-ARTICULAR; INTRALESIONAL; INTRAMUSCULAR; INTRAVENOUS; SOFT TISSUE at 14:58

## 2021-08-20 RX ADMIN — ONDANSETRON 4 MG: 2 INJECTION INTRAMUSCULAR; INTRAVENOUS at 15:43

## 2021-08-20 RX ADMIN — CEFAZOLIN 3 G: 1 INJECTION, POWDER, FOR SOLUTION INTRAMUSCULAR; INTRAVENOUS; PARENTERAL at 14:40

## 2021-08-20 RX ADMIN — SODIUM CHLORIDE, POTASSIUM CHLORIDE, SODIUM LACTATE AND CALCIUM CHLORIDE 9 ML/HR: 600; 310; 30; 20 INJECTION, SOLUTION INTRAVENOUS at 11:55

## 2021-08-20 RX ADMIN — EPHEDRINE SULFATE 10 MG: 50 INJECTION INTRAVENOUS at 15:39

## 2021-08-20 RX ADMIN — FENTANYL CITRATE 50 MCG: 50 INJECTION INTRAMUSCULAR; INTRAVENOUS at 15:03

## 2021-08-20 RX ADMIN — EPHEDRINE SULFATE 5 MG: 50 INJECTION INTRAVENOUS at 15:19

## 2021-08-20 RX ADMIN — EPHEDRINE SULFATE 10 MG: 50 INJECTION INTRAVENOUS at 15:30

## 2021-08-20 RX ADMIN — ACETAMINOPHEN 1000 MG: 500 TABLET, FILM COATED ORAL at 11:54

## 2021-08-20 RX ADMIN — EPHEDRINE SULFATE 10 MG: 50 INJECTION INTRAVENOUS at 15:36

## 2021-08-20 RX ADMIN — EPHEDRINE SULFATE 5 MG: 50 INJECTION INTRAVENOUS at 15:33

## 2021-08-20 RX ADMIN — FENTANYL CITRATE 25 MCG: 50 INJECTION INTRAMUSCULAR; INTRAVENOUS at 15:48

## 2021-08-20 RX ADMIN — MIDAZOLAM HYDROCHLORIDE 4 MG: 1 INJECTION, SOLUTION INTRAMUSCULAR; INTRAVENOUS at 12:54

## 2021-08-20 RX ADMIN — DEXMEDETOMIDINE HYDROCHLORIDE 5 MCG: 100 INJECTION, SOLUTION INTRAVENOUS at 14:57

## 2021-08-20 RX ADMIN — HYDROMORPHONE HYDROCHLORIDE 0.5 MG: 2 INJECTION, SOLUTION INTRAMUSCULAR; INTRAVENOUS; SUBCUTANEOUS at 15:28

## 2021-08-20 RX ADMIN — DEXMEDETOMIDINE HYDROCHLORIDE 5 MCG: 100 INJECTION, SOLUTION INTRAVENOUS at 15:05

## 2021-08-20 NOTE — ANESTHESIA PREPROCEDURE EVALUATION
Anesthesia Evaluation     Patient summary reviewed and Nursing notes reviewed   no history of anesthetic complications:  NPO Solid Status: > 8 hours  NPO Liquid Status: > 2 hours           Airway   Mallampati: II  TM distance: >3 FB  Neck ROM: full  No difficulty expected  Dental      Pulmonary - negative pulmonary ROS and normal exam    breath sounds clear to auscultation  Cardiovascular - normal exam  Exercise tolerance: good (4-7 METS)    Rhythm: regular    (+) valvular problems/murmurs, dysrhythmias Atrial Fib,       Neuro/Psych- negative ROS  GI/Hepatic/Renal/Endo - negative ROS     Musculoskeletal     Abdominal    Substance History - negative use     OB/GYN negative ob/gyn ROS         Other   arthritis,      ROS/Med Hx Other:  per pt hx of paroxysmal afib. w/u w/challappa 2019.   Echo, holter, stress test 2019 negative.                 Anesthesia Plan    ASA 3     general with block and general   (Patient understands anesthesia not responsible for dental damage. Regional anesthesia options discussed with patient. Pt accepts regional block.)  intravenous induction     Anesthetic plan, all risks, benefits, and alternatives have been provided, discussed and informed consent has been obtained with: patient.  Use of blood products discussed with patient .   Plan discussed with CRNA.

## 2021-08-20 NOTE — ANESTHESIA POSTPROCEDURE EVALUATION
Patient: Saul Morfin    Procedure Summary     Date: 08/20/21 Room / Location: [facility] OR 03 / [facility] MAIN OR    Anesthesia Start: 1430 Anesthesia Stop: 1624    Procedure: QUADRICEPS TENDON REPAIR, LEFT (Left Thigh) Diagnosis:       Quadriceps tendon rupture, left, initial encounter      (Quadriceps tendon rupture, left, initial encounter [S76.112A])    Surgeons: Luis Miguel Amezcua MD Provider: Anette Ellsworth DO    Anesthesia Type: general with block, general ASA Status: 3          Anesthesia Type: general with block, general    Vitals  Vitals Value Taken Time   /75 08/20/21 1619   Temp 36.7 °C (98 °F) 08/20/21 1619   Pulse 88 08/20/21 1627   Resp 16 08/20/21 1619   SpO2 94 % 08/20/21 1627   Vitals shown include unvalidated device data.        Post Anesthesia Care and Evaluation    Patient location during evaluation: bedside  Patient participation: complete - patient participated  Level of consciousness: awake  Pain management: adequate  Airway patency: patent  Anesthetic complications: No anesthetic complications  PONV Status: none  Cardiovascular status: acceptable and stable  Respiratory status: acceptable  Hydration status: acceptable    Comments: An Anesthesiologist personally participated in the most demanding procedures (including induction and emergence if applicable) in the anesthesia plan, monitored the course of anesthesia administration at frequent intervals and remained physically present and available for immediate diagnosis and treatment of emergencies.

## 2021-08-20 NOTE — ANESTHESIA PROCEDURE NOTES
Peripheral Block      Patient reassessed immediately prior to procedure    Patient location during procedure: pre-op  Start time: 8/20/2021 1:15 PM  Stop time: 8/20/2021 1:23 PM  Reason for block: at surgeon's request and post-op pain management  Preanesthetic Checklist  Completed: patient identified, IV checked, site marked, risks and benefits discussed, surgical consent, monitors and equipment checked, pre-op evaluation and timeout performed  Prep:  Pt Position: supine  Sterile barriers:alcohol skin prep, partial drape, cap, washed/disinfected hands, mask and gloves  Prep: ChloraPrep  Patient monitoring: blood pressure monitoring, continuous pulse oximetry and EKG  Procedure  Sedation:yes  Performed under: local infiltration  Guidance:ultrasound guided and nerve stimulator  ULTRASOUND INTERPRETATION.  Using ultrasound guidance a 20 G gauge needle was placed in close proximity to the femoral nerve, at which point, under ultrasound guidance anesthetic was injected in the area of the nerve and spread of the anesthesia was seen on ultrasound in close proximity thereto.  There were no abnormalities seen on ultrasound; a digital image was taken; and the patient tolerated the procedure with no complications. Images:still images obtained, printed/placed on chart    Block Type:femoral  Injection Technique:single-shot  Needle Type:echogenic  Needle Gauge:20 G (4in)  Resistance on Injection: none    Medications Used: bupivacaine-EPINEPHrine PF (MARCAINE w/EPI) 0.5% -1:005582 injection, 30 mL  Med admintered at 8/20/2021 1:22 PM      Post Assessment  Injection Assessment: negative aspiration for heme, no paresthesia on injection and incremental injection  Patient Tolerance:comfortable throughout block  Complications:no  Additional Notes  The block or continuous infusion is requested by the referring physician for management of postoperative pain, or pain related to a procedure. Ultrasound guidance (deemed medically  necessary). Painless injection, pt was awake and conversant during the procedure without complications. Needle and surrounding structures visualized throughout procedure. No adverse reactions or complications seen during this period. Post-procedure image showed no signs of complication, and anatomy was consistent with an uncomplicated nerve blockade.

## 2021-08-20 NOTE — OP NOTE
QUADRICEPS TENDON REPAIR  Procedure Report    Patient Name:  Saul Morfin  YOB: 1980    Date of Surgery:  8/20/2021     Indications: The patient sustained a mechanical fall injuring the knee and suffering a quadriceps tendon injury.  This was confirmed by MRI.  He was seen in the office and was evaluated.  He was unable to perform a straight leg raise.  We discussed risks and benefits of surgery including bleeding, infection, damage to neurovascular structures, heart attack, stroke, DVT/PE, anesthesia complications occluding death, continued pain disability, stiffness, rerupture, among others.  Informed consent was obtained and he wished to proceed.    Pre-op Diagnosis:   Quadriceps tendon rupture, left, initial encounter [S76.112A]       Post-Op Diagnosis Codes:     * Quadriceps tendon rupture, left, initial encounter [S76.112A]    Procedure/CPT® Codes:      Procedure(s):  QUADRICEPS TENDON REPAIR, LEFT    Staff:  Surgeon(s):  Luis Miguel Amezcua MD    Assistant: Rosalia Bernal RN    Anesthesia: General with Block    Estimated Blood Loss: 50 mL    Implants:    Nothing was implanted during the procedure    Specimen:          None        Findings: Full-thickness quadriceps tendon tear    Complications: None    Description of Procedure: The patient was brought to the operating room and placed supine on the OR table.  General anesthesia was given.  Preoperatively, the patient received a femoral nerve block. The patient was placed supine on the OR table.  All bony prominences were padded. The tourniquet was placed on the thigh. The affected lower extremity was prepped and draped in the usual sterile fashion. Preoperative antibiotic was given.  Formal timeout was held.  The limb was exsanguinated and tourniquet was inflated.  A longitudinal incision was made over the knee.  There was a full-thickness quadriceps tendon tear.  There was a small amount of remaining rectus attaching to the patella at the  deep layer.  This was released and then the tendon was debrided and the patella was debrided to a bleeding bony surface.  The retinacular tissues were also torn medially.  A #5 FiberWire suture was used to create a running locking modified [place] type stitch up and down the quadriceps tendon medially and laterally using 2 sutures.  3 parallel drill holes were made in the patella with a 2.4 mm drill.  A suture passer was used to pass the sutures through the patella.  A free needle was used to pass the sutures so there is no soft tissue bridge.  The knee was placed in full extension while the sutures were pulled tight and tied repairing the quadriceps tendon to the patella.  A #2 FiberWire was then used to repair the retinacular tissues.  The tourniquet was released and bleeding was minimal.  The wound was irrigated with irrisept and saline.  Subcutaneous tissues were closed with 2-0 Vicryl and the skin with staples.  An Aquacel dressing soft dressing and a brace locked in full extension were placed.  Patient will from anesthesia in stable condition.  There were no complications and all counts were correct.  Patient was stable to recovery.      Assistant: Rosalia Bernal RN  was responsible for performing the following activities: Retraction, Suction, Irrigation and Placing Dressing and their skilled assistance was necessary for the success of this case.    Luis Miguel Amezcua MD     Date: 8/20/2021  Time: 16:12 EDT

## 2021-08-25 DIAGNOSIS — S76.112A QUADRICEPS TENDON RUPTURE, LEFT, INITIAL ENCOUNTER: ICD-10-CM

## 2021-08-25 RX ORDER — OXYCODONE AND ACETAMINOPHEN 7.5; 325 MG/1; MG/1
1 TABLET ORAL EVERY 4 HOURS PRN
Qty: 30 TABLET | Refills: 0 | Status: SHIPPED | OUTPATIENT
Start: 2021-08-25 | End: 2021-08-26 | Stop reason: SDUPTHER

## 2021-08-25 NOTE — TELEPHONE ENCOUNTER
Patient is requesting a refill on pain medicine. He is currently taking Oxycodone 7.5/325. He uses [facility] in [place]. Patient's best phone number is [phone].

## 2021-08-26 DIAGNOSIS — S76.112A QUADRICEPS TENDON RUPTURE, LEFT, INITIAL ENCOUNTER: ICD-10-CM

## 2021-08-26 RX ORDER — OXYCODONE AND ACETAMINOPHEN 7.5; 325 MG/1; MG/1
1 TABLET ORAL EVERY 4 HOURS PRN
Qty: 30 TABLET | Refills: 0 | Status: SHIPPED | OUTPATIENT
Start: 2021-08-26 | End: 2021-11-17

## 2021-09-03 ENCOUNTER — OFFICE VISIT (OUTPATIENT)
Dept: ORTHOPEDIC SURGERY | Facility: CLINIC | Age: 41
End: 2021-09-03

## 2021-09-03 VITALS — BODY MASS INDEX: 42 KG/M2 | HEIGHT: 71 IN | RESPIRATION RATE: 14 BRPM | WEIGHT: 300 LBS

## 2021-09-03 DIAGNOSIS — Z98.890 S/P KNEE SURGERY: Primary | ICD-10-CM

## 2021-09-03 PROCEDURE — 99024 POSTOP FOLLOW-UP VISIT: CPT | Performed by: ORTHOPAEDIC SURGERY

## 2021-09-03 NOTE — PROGRESS NOTES
"Chief Complaint  Pain of the Left Knee     Subjective      Saul Morfin presents to [facility] for follow up evaluation of the left knee. The patient is S/P Left Quadriceps Tendon Repair 8/20/21. His staples were removed today. He is overall doing well. He is wearing a hinged knee brace. He has no new complaints.     No Known Allergies     Social History     Socioeconomic History   • Marital status:      Spouse name: Not on file   • Number of children: Not on file   • Years of education: Not on file   • Highest education level: Not on file   Tobacco Use   • Smoking status: Never Smoker   • Smokeless tobacco: Never Used   Vaping Use   • Vaping Use: Never used   Substance and Sexual Activity   • Alcohol use: Yes     Comment: OCCASIONALLY DRINKS, LESS THAN 1 DRINKS PER DAY, HAS BEEN DRINKING FOR 21-30 YEARS    • Drug use: Not Currently     Comment: IN THE PAST         Review of Systems     Objective   Vital Signs:   Resp 14   Ht 180.3 cm (71\")   Wt 136 kg (300 lb)   BMI 41.84 kg/m²       Physical Exam  Constitutional:       Appearance: Normal appearance. The patient is well-developed and normal weight.   HENT:      Head: Normocephalic.      Right Ear: Hearing and external ear normal.      Left Ear: Hearing and external ear normal.      Nose: Nose normal.   Eyes:      Conjunctiva/sclera: Conjunctivae normal.   Cardiovascular:      Rate and Rhythm: Normal rate.   Pulmonary:      Effort: Pulmonary effort is normal.      Breath sounds: No wheezing or rales.   Abdominal:      Palpations: Abdomen is soft.      Tenderness: There is no abdominal tenderness.   Musculoskeletal:      Cervical back: Normal range of motion.   Skin:     Findings: No rash.   Neurological:      Mental Status: The patient is alert and oriented to person, place, and time.   Psychiatric:         Mood and Affect: Mood and affect normal.         Judgment: Judgment normal.       Ortho Exam      Unable to hold " straight leg raise but knee full extends. 1+ effusion. Incision well healing. No signs of infection. Neurovascularly intact. Negative Hernandes. Calf soft and compressible.     Procedures        Imaging Results (Most Recent)     None           Result Review :       XR Knee 3 View Left    Result Date: 8/14/2021  Narrative: PROCEDURE: XR KNEE 3 VW LEFT  COMPARISON: None  INDICATIONS: stepped in hole, twisting knee  FINDINGS:  There is no evidence of an acute fracture.  There is a small amount of fluid in the suprapatellar bursa.  There are no lytic or destructive bony lesions.  There is fragmentation anterior tibial tuberosity.  CONCLUSION: Small joint effusion.  No acute bony abnormality.      EMILI HUANG MD       Electronically Signed and Approved By: EMILI HUANG MD on 8/14/2021 at 23:33             MRI Knee Left Without Contrast    Result Date: 8/19/2021  Narrative: PROCEDURE: MRI KNEE LEFT  WO CONTRAST  COMPARISON: None  INDICATIONS: LEFT KNEE QUADRICEPS TENDON TEAR, ANTERIOR KNEE PAIN SINCE STEPPING IN A HOLE ON SATURDAY  TECHNIQUE: A complete multi-planar MRI was performed.   FINDINGS:  There is approximately 1.2 cm lateral subluxation of the patella and moderate lateral patellar tilt.  There is a complete tear the medial patellar retinaculum and medial patellofemoral ligament at the patellar insertions.  The lateral patellar retinaculum appears intact.  Moderate edema/hemorrhage is noted surrounding the knee joint.  The central fibers of the quadriceps tendon are completely torn.  The tear measures 1.5 cm transverse involving approximately 80% of the transverse tendon diameter.  There are intact fibers both the medial and lateral to this tear which is located approximately 1.5 cm superior to the patella.  Of note, the musculotendinous junction of the quadriceps is not completely imaged on this exam.  There is fragmentation of the tibial tubercle consistent with old Osgood-Schlatter disease.  The patellar tendon  is intact.  No fracture or focal osseous lesion is demonstrated.  No meniscal tear is seen.  The cruciate ligaments, medial collateral ligament, lateral collateral ligament complex appear unremarkable.  A moderate knee joint effusion is noted.  Cartilage in the joint is intact.  No loose body is seen.  Moderate to severe soft tissue edema is noted around the knee joint.  No popliteal cyst is seen.  CONCLUSION:  1. Approximately 80% tear of the distal quadriceps tendon, as above. 2. Full-thickness tears involving the patellar insertions of the medial patellar retinaculum and medial patellofemoral ligament 3. Associated 1.2 cm lateral patellar subluxation and moderate lateral patellar tilt 4. Moderate knee joint effusion 5. Moderate to severe soft tissue edema around the knee joint     Jann Hand M.D.       Electronically Signed and Approved By: Jann Hand M.D. on 8/19/2021 at 13:29             Peripheral Block    Result Date: 8/20/2021  Narrative: Tate Tabor MD     8/20/2021  1:26 PM Peripheral Block Patient reassessed immediately prior to procedure Patient location during procedure: pre-op Start time: 8/20/2021 1:15 PM Stop time: 8/20/2021 1:23 PM Reason for block: at surgeon's request and post-op pain management Preanesthetic Checklist Completed: patient identified, IV checked, site marked, risks and benefits discussed, surgical consent, monitors and equipment checked, pre-op evaluation and timeout performed Prep: Pt Position: supine Sterile barriers:alcohol skin prep, partial drape, cap, washed/disinfected hands, mask and gloves Prep: ChloraPrep Patient monitoring: blood pressure monitoring, continuous pulse oximetry and EKG Procedure Sedation:yes Performed under: local infiltration Guidance:ultrasound guided and nerve stimulator ULTRASOUND INTERPRETATION.  Using ultrasound guidance a 20 G gauge needle was placed in close proximity to the femoral nerve, at which point, under ultrasound guidance  anesthetic was injected in the area of the nerve and spread of the anesthesia was seen on ultrasound in close proximity thereto.  There were no abnormalities seen on ultrasound; a digital image was taken; and the patient tolerated the procedure with no complications. Images:still images obtained, printed/placed on chart Block Type:femoral Injection Technique:single-shot Needle Type:echogenic Needle Gauge:20 G (4in) Resistance on Injection: none Medications Used: bupivacaine-EPINEPHrine PF (MARCAINE w/EPI) 0.5% -1:750023 injection, 30 mL Med admintered at 8/20/2021 1:22 PM Post Assessment Injection Assessment: negative aspiration for heme, no paresthesia on injection and incremental injection Patient Tolerance:comfortable throughout block Complications:no Additional Notes The block or continuous infusion is requested by the referring physician for management of postoperative pain, or pain related to a procedure. Ultrasound guidance (deemed medically necessary). Painless injection, pt was awake and conversant during the procedure without complications. Needle and surrounding structures visualized throughout procedure. No adverse reactions or complications seen during this period. Post-procedure image showed no signs of complication, and anatomy was consistent with an uncomplicated nerve blockade.              Assessment and Plan     DX: S/P Left Quadriceps Tendon Repair    Discussed the treatment plan with the patient.  Order for physical therapy given today. Plan to continue brace.     Call or return if worsening symptoms.    Follow Up     4 weeks       Patient was given instructions and counseling regarding his condition or for health maintenance advice. Please see specific information pulled into the AVS if appropriate.     Scribed for Luis Miguel Amezcua MD by Cristiane Cid.  09/03/21   11:04 EDT    I have personally performed the services described in this document as scribed by the above individual and  it is both accurate and complete. Luis Miguel Amezcua MD 09/06/21

## 2021-09-15 ENCOUNTER — TREATMENT (OUTPATIENT)
Dept: PHYSICAL THERAPY | Facility: CLINIC | Age: 41
End: 2021-09-15

## 2021-09-15 DIAGNOSIS — Z98.890 S/P LEFT KNEE SURGERY: Primary | ICD-10-CM

## 2021-09-15 DIAGNOSIS — M25.562 LEFT KNEE PAIN, UNSPECIFIED CHRONICITY: ICD-10-CM

## 2021-09-15 DIAGNOSIS — R26.9 GAIT DISTURBANCE: ICD-10-CM

## 2021-09-15 PROCEDURE — 97161 PT EVAL LOW COMPLEX 20 MIN: CPT | Performed by: PHYSICAL THERAPIST

## 2021-09-15 NOTE — PROGRESS NOTES
Physical Therapy Initial Evaluation and Plan of Care      Patient: Saul Morfin   : 1980  Diagnosis/ICD-10 Code:  S/P left knee surgery [Z98.890]  Referring practitioner: Luis Miguel Amezcua MD  Date of Initial Visit: 9/15/2021  Today's Date: 2021  Patient seen for 1 sessions    Progress note due: 10/28/2021  Re-cert due: 2021           Subjective Questionnaire: LEFS:     PMH: left hip labral repair ,        Subjective Evaluation    History of Present Illness  Date of surgery: 2021    Subjective comment: Pt states L knee pain hurts in the morning but no pain during walking, He presents today with crutches and has L knee brace.   Patient Occupation: works from home  Pain  Current pain ratin    Patient Goals  Patient goals for therapy: decreased edema  Patient goal: ride motorcycle, jog, and walk dogs           Objective          Active Range of Motion     Right Knee   Flexion: 135 degrees   Extension: 0 degrees     Passive Range of Motion   Left Knee   Flexion: 80 degrees   Extension: 0 degrees     Patellar Mobility   Left Knee Hypomobile in the left medial, left lateral, left superior and left inferior patellar tendon(s).     Right Knee Patellar tendons within functional limits include the medial, lateral, superior and inferior.     Strength/Myotome Testing     Left Hip   Planes of Motion   Flexion: 4  Abduction: 4  Adduction: 4    Right Hip   Planes of Motion   Flexion: 4+  Abduction: 4+  Adduction: 4+    Left Knee   Quadriceps contraction: fair    Right Knee   Flexion: 5  Extension: 5  Quadriceps contraction: good    Left Ankle/Foot   Dorsiflexion: 5  Plantar flexion: 5    Right Ankle/Foot   Dorsiflexion: 5  Plantar flexion: 5    Swelling     Left Knee Girth Measurement (cm)   Joint line: 50 cm    Right Knee Girth Measurement (cm)   Joint line: 45 cm    Ambulation   Weight-Bearing Status   Weight-Bearing Status (Left): weight-bearing as tolerated   Weight-Bearing Status (Right):  weight-bearing as tolerated    Assistive device used: crutches    Observational Gait   Gait: antalgic   Decreased walking speed, stride length, left stance time, left swing time and left step length.               Assessment & Plan     Assessment  Impairments: abnormal gait, abnormal muscle firing, abnormal or restricted ROM, activity intolerance, impaired balance, impaired physical strength, lacks appropriate home exercise program, pain with function and weight-bearing intolerance  Assessment details: Pt presents s/p L quad tendon repair and presents with L knee mobility, strength, and gait deficits. Pt will benefit from skilled PT to address functional deficits and return to PLOF.       Prognosis: good  Functional Limitations: walking, uncomfortable because of pain, standing and unable to perform repetitive tasks  Goals  Plan Goals: KNEE PROBLEMS:     1. The patient has limited ROM of the L knee.   LTG 1: 12 weeks:  The patient will demonstrate 0 to 135 degrees of ROM for the L knee in order to allow patient to complete prolonged walking, standing, stairs and other ADLs with decreased pain/difficulty.    STATUS:  New   STG 1a:  6 weeks:  The patient will demonstrate 0 to 100 degrees of ROM for the L knee.    STATUS:  New   TREATMENT: Manual therapy, therapeutic exercise, home exercise instruction, and modalities as needed to include:  moist heat, electrical stimulation, ultrasound, and ice.    2. The patient has limited strength of the L knee.   LTG 2: 12 weeks: The patient will demonstrate 5/5 strength for L knee flexion and extension in order to allow patient improved joint stability    STATUS:  New   STG 2a: 6 weeks: The patient will demonstrate 4/5 strength for L knee flexion and extension    STATUS:  New    TREATMENT: Manual therapy, therapeutic exercise, home exercise instruction, aquatic therapy, and modalities as needed to include:  moist heat, electrical stimulation, ultrasound, and ice.     3. The  patient has gait dysfunction.   LTG 3: 12 weeks:  The patient will ambulate without assistive device, independently, for community distances with minimal limp to the L lower extremity in order to improve mobility and allow patient to perform activities such as grocery shopping with greater ease.    STATUS:  New   TREATMENT: Gait training, aquatic therapy, therapeutic exercise, and home exercise instruction.    4. Mobility: Walking/Moving Around Functional Limitation     LTG 4: 12 weeks:  The patient will demonstrate  LEFS score of 70 in order to decrease limitations.    STATUS:  New   STG 4 a: 6weeks:  The patient will demonstrate  LEFS score of 50 in order to decrease limitations.    STATUS:  New   TREATMENT:  Manual therapy, therapeutic exercise, home exercise instruction.       Plan  Therapy options: will be seen for skilled physical therapy services  Planned modality interventions: cryotherapy, TENS, thermotherapy (hydrocollator packs) and electrical stimulation/Russian stimulation  Planned therapy interventions: balance/weight-bearing training, flexibility, functional ROM exercises, gait training, home exercise program, joint mobilization, manual therapy, neuromuscular re-education, soft tissue mobilization, strengthening, stretching and therapeutic activities  Frequency: 2x week  Duration in weeks: 12  Treatment plan discussed with: patient        Visit Diagnoses:    ICD-10-CM ICD-9-CM   1. S/P left knee surgery  Z98.890 V45.89   2. Gait disturbance  R26.9 781.2   3. Left knee pain, unspecified chronicity  M25.562 719.46       Timed:         Manual Therapy:    0     mins  35766;     Therapeutic Exercise:    0     mins  20238;     Neuromuscular Johnnie:    0    mins  95554;    Therapeutic Activity:     0     mins  21259;     Gait Trainin     mins  14605;     Ultrasound:     0     mins  36993;    Ionto                               0    mins   51968  Self-care  __0__ mins 34553    Un-Timed:  Electrical  Stimulation:    0     mins  66694 ( );  Traction     0     mins 87749  Low Eval     30     Mins  75705  Mod Eval     0     Mins  09612  High Eval                       0     Mins  32171  Hot pack     0     Mins    Cold pack                       0     Mins      Timed Treatment:   0   mins   Total Treatment:     30   mins    PT SIGNATURE: Jazmin Rivera PT, DPT        Initial Certification  Certification Period: 9/28/2021 thru 12/27/2021  I certify that the therapy services are furnished while this patient is under my care.  The services outlined above are required by this patient, and will be reviewed every 90 days.     PHYSICIAN:       DATE:     Please sign and return via fax to [phone].. Thank you, [facility].

## 2021-09-20 ENCOUNTER — TREATMENT (OUTPATIENT)
Dept: PHYSICAL THERAPY | Facility: CLINIC | Age: 41
End: 2021-09-20

## 2021-09-20 DIAGNOSIS — Z98.890 S/P RIGHT KNEE SURGERY: Primary | ICD-10-CM

## 2021-09-20 DIAGNOSIS — R26.9 GAIT DISTURBANCE: ICD-10-CM

## 2021-09-20 DIAGNOSIS — M25.561 RIGHT KNEE PAIN, UNSPECIFIED CHRONICITY: ICD-10-CM

## 2021-09-20 PROCEDURE — 97110 THERAPEUTIC EXERCISES: CPT | Performed by: PHYSICAL THERAPIST

## 2021-09-20 NOTE — PROGRESS NOTES
Physical Therapy Daily Progress Note        Patient: Saul Morfin   : 1980  Diagnosis/ICD-10 Code:  S/P right knee surgery [Z98.890]  Referring practitioner: Luis Miguel Amezcua MD  Date of Initial Visit: Type: THERAPY  Noted: 9/15/2021  Today's Date: 2021  Patient seen for 2 sessions             Subjective   Saul Morfin reports: no pain at beginning of session today.    Objective   Left quadriceps set: good activation  See Exercise, Manual, and Modality Logs for complete treatment.       Assessment/Plan  Patient tolerated all exercise progressions and manual therapy well today but continues to demonstrate strength/ROM deficits limiting function. Continue to progress per patient tolerance.    Progress per Plan of Care           Timed:  Manual Therapy:    5     mins  88123;  Therapeutic Exercise:    25     mins  02259;     Neuromuscular Johnnie:    0    mins  85003;    Therapeutic Activity:     0     mins  64262;     Gait Trainin     mins  81510;     Ultrasound:     0     mins  56177;    Electrical Stimulation:    0     mins  18538;  Iontophoresis     0     mins  10030    Untimed:  Electrical Stimulation:    0     mins  45235 ( );  Mechanical Traction:    0     mins  85119;   Fluidotherapy     0     mins  75232  Hot pack     0     Mins    Cold pack                       0     Mins     Timed Treatment:   30   mins   Total Treatment:     30   mins        Valentina Camargo PT  Physical Therapist

## 2021-09-23 ENCOUNTER — TREATMENT (OUTPATIENT)
Dept: PHYSICAL THERAPY | Facility: CLINIC | Age: 41
End: 2021-09-23

## 2021-09-23 DIAGNOSIS — R26.9 GAIT DISTURBANCE: ICD-10-CM

## 2021-09-23 DIAGNOSIS — Z98.890 S/P RIGHT KNEE SURGERY: Primary | ICD-10-CM

## 2021-09-23 DIAGNOSIS — M25.561 RIGHT KNEE PAIN, UNSPECIFIED CHRONICITY: ICD-10-CM

## 2021-09-23 PROCEDURE — 97110 THERAPEUTIC EXERCISES: CPT | Performed by: PHYSICAL THERAPIST

## 2021-09-23 NOTE — PROGRESS NOTES
Physical Therapy Daily Progress Note        Patient: Saul Morfin   : 1980  Diagnosis/ICD-10 Code:  S/P right knee surgery [Z98.890]  Referring practitioner: Luis Miguel Amezcua MD  Date of Initial Visit: Type: THERAPY  Noted: 9/15/2021  Today's Date: 2021  Patient seen for 3 sessions             Subjective   Saul Morfin reports: no pain today.    Objective   Bilateral hip abduction: 4-/5  See Exercise, Manual, and Modality Logs for complete treatment.       Assessment/Plan  Patient tolerated all exercise progressions and manual therapy well today but continues to demonstrate strength/ROM deficits limiting function. Continue to progress per patient tolerance.    Progress per Plan of Care           Timed:  Manual Therapy:    5     mins  38395;  Therapeutic Exercise:    25     mins  40274;     Neuromuscular Johnnie:    0    mins  41210;    Therapeutic Activity:     0     mins  78705;     Gait Trainin     mins  62045;     Ultrasound:     0     mins  76310;    Electrical Stimulation:    0     mins  98444;  Iontophoresis     0     mins  17030    Untimed:  Electrical Stimulation:    0     mins  42686 ( );  Mechanical Traction:    0     mins  78407;   Fluidotherapy     0     mins  91477  Hot pack     0     Mins    Cold pack                       0     Mins     Timed Treatment:   30   mins   Total Treatment:     30   mins        Valentina Camargo PT  Physical Therapist

## 2021-09-27 ENCOUNTER — TREATMENT (OUTPATIENT)
Dept: PHYSICAL THERAPY | Facility: CLINIC | Age: 41
End: 2021-09-27

## 2021-09-27 DIAGNOSIS — Z98.890 S/P RIGHT KNEE SURGERY: Primary | ICD-10-CM

## 2021-09-27 DIAGNOSIS — R26.9 GAIT DISTURBANCE: ICD-10-CM

## 2021-09-27 DIAGNOSIS — M25.561 RIGHT KNEE PAIN, UNSPECIFIED CHRONICITY: ICD-10-CM

## 2021-09-27 PROCEDURE — 97110 THERAPEUTIC EXERCISES: CPT | Performed by: PHYSICAL THERAPIST

## 2021-09-27 NOTE — PROGRESS NOTES
"Physical Therapy Daily Progress Note        Patient: Saul Morfin   : 1980  Diagnosis/ICD-10 Code:  S/P right knee surgery [Z98.890]  Referring practitioner: Luis Miguel Amezcua MD  Date of Initial Visit: No linked episodes  Today's Date: 2021  Patient seen for Visit count could not be calculated. Make sure you are using a visit which is associated with an episode. sessions             Subjective   aSul Morfin denies having any specific pain in Left Knee- primarily only reporting \"Aching\" and some proximal quad -patellar discomfort.    Objective       Left Hip Abduction Strength    Moderate edema noted in Left knee    See Exercise and Manual Logs for complete treatment.       Assessment/Plan  Pt tolerated therapy session well - with progression of therapeutic exercises and Manual therapy. He has improved, but continues to have deficits in Left Knee  ROM,  Strength, and Stability; limiting function and ability to perform ADLs at this time.  Continue to progress pt as tolerated as per protocol.    Progress per Plan of Care           Timed:  Manual Therapy:    5     mins  08141;  Therapeutic Exercise:    25     mins  54793;     Neuromuscular Johnnie:    0    mins  59213;    Therapeutic Activity:     0     mins  08233;     Gait Trainin     mins  52348;     Ultrasound:     0     mins  56017;    Electrical Stimulation:    0     mins  62921;  Iontophoresis     0     mins  11517    Untimed:  Electrical Stimulation:    0     mins  11949 ( );  Mechanical Traction:    0     mins  68014;   Fluidotherapy     0     mins  40513  Hot pack     0     mins  59003  Cold pack     0     mins  94870    Timed Treatment:   30   mins   Total Treatment:     30   mins        Carleen Maya PTA  Physical Therapist Assistant  "

## 2021-09-29 ENCOUNTER — TREATMENT (OUTPATIENT)
Dept: PHYSICAL THERAPY | Facility: CLINIC | Age: 41
End: 2021-09-29

## 2021-09-29 DIAGNOSIS — Z98.890 S/P LEFT KNEE SURGERY: Primary | ICD-10-CM

## 2021-09-29 DIAGNOSIS — M25.562 LEFT KNEE PAIN, UNSPECIFIED CHRONICITY: ICD-10-CM

## 2021-09-29 DIAGNOSIS — R26.9 GAIT DISTURBANCE: ICD-10-CM

## 2021-09-29 PROCEDURE — 97110 THERAPEUTIC EXERCISES: CPT | Performed by: PHYSICAL THERAPIST

## 2021-09-29 NOTE — PROGRESS NOTES
Physical Therapy Daily Treatment Note      Patient: Saul Morfin   : 1980  Referring practitioner: No ref. provider found  Date of Initial Visit: Type: THERAPY  Noted: 9/15/2021  Today's Date: 2021  Patient seen for 5 sessions         Saul Morfin reports: knee pain is 5/10 today. He says he slept with a compression sleeve on a couple of days ago and the next day his knee swelled up and was more painful than usual. But since yesterday, the pain has improved.       Subjective     Objective   L knee tenderness along quad tendon and medial joint line.   See Exercise, Manual, and Modality Logs for complete treatment.       Assessment & Plan     Assessment  Assessment details: Tolerated session although patient reports increased knee pain as compared to previous session. Progress patient next visit per protocol.         Visit Diagnoses:    ICD-10-CM ICD-9-CM   1. S/P left knee surgery  Z98.890 V45.89   2. Left knee pain, unspecified chronicity  M25.562 719.46   3. Gait disturbance  R26.9 781.2       Progress per Plan of Care           Timed:         Manual Therapy:    0     mins  92872;     Therapeutic Exercise:    25     mins  56487;     Neuromuscular Johnnie:    0    mins  51472;    Therapeutic Activity:     0     mins  68610;     Gait Trainin     mins  29201;     Ultrasound:     0     mins  26946;    Ionto                               0    mins   27169  Self-care  __0__ mins 31673    Un-Timed:  Electrical Stimulation:    0     mins  65661 ( );  Traction     0     mins 53560  Hot pack     0     Mins    Cold pack                       0     Mins      Timed Treatment:   25   mins   Total Treatment:     25   mins    Jazmin Rivera PT, DPT  Physical Therapist

## 2021-10-01 ENCOUNTER — OFFICE VISIT (OUTPATIENT)
Dept: ORTHOPEDIC SURGERY | Facility: CLINIC | Age: 41
End: 2021-10-01

## 2021-10-01 VITALS — BODY MASS INDEX: 40.6 KG/M2 | HEIGHT: 71 IN | RESPIRATION RATE: 14 BRPM | WEIGHT: 290 LBS

## 2021-10-01 DIAGNOSIS — S76.112A QUADRICEPS TENDON RUPTURE, LEFT, INITIAL ENCOUNTER: Primary | ICD-10-CM

## 2021-10-01 PROCEDURE — 99024 POSTOP FOLLOW-UP VISIT: CPT | Performed by: ORTHOPAEDIC SURGERY

## 2021-10-01 NOTE — PROGRESS NOTES
"Chief Complaint  Pain of the Left Knee     Subjective      Saul Morfin presents to [facility] for follow up evaluation of the left knee. The patient is S/P Left Quadriceps Tendon Repair 8/20/21.  He has been in an ACL brace. He is ambulating with a cane. He states he isn't having much pain. He is overall doing well.     No Known Allergies     Social History     Socioeconomic History   • Marital status:      Spouse name: Not on file   • Number of children: Not on file   • Years of education: Not on file   • Highest education level: Not on file   Tobacco Use   • Smoking status: Never Smoker   • Smokeless tobacco: Never Used   Vaping Use   • Vaping Use: Never used   Substance and Sexual Activity   • Alcohol use: Yes     Comment: OCCASIONALLY DRINKS, LESS THAN 1 DRINKS PER DAY, HAS BEEN DRINKING FOR 21-30 YEARS    • Drug use: Not Currently     Comment: IN THE PAST         Review of Systems     Objective   Vital Signs:   Resp 14   Ht 180.3 cm (71\")   Wt 132 kg (290 lb)   BMI 40.45 kg/m²       Physical Exam  Constitutional:       Appearance: Normal appearance. The patient is well-developed and normal weight.   HENT:      Head: Normocephalic.      Right Ear: Hearing and external ear normal.      Left Ear: Hearing and external ear normal.      Nose: Nose normal.   Eyes:      Conjunctiva/sclera: Conjunctivae normal.   Cardiovascular:      Rate and Rhythm: Normal rate.   Pulmonary:      Effort: Pulmonary effort is normal.      Breath sounds: No wheezing or rales.   Abdominal:      Palpations: Abdomen is soft.      Tenderness: There is no abdominal tenderness.   Musculoskeletal:      Cervical back: Normal range of motion.   Skin:     Findings: No rash.   Neurological:      Mental Status: The patient is alert and oriented to person, place, and time.   Psychiatric:         Mood and Affect: Mood and affect normal.         Judgment: Judgment normal.       Ortho Exam      Left knee- can " hold a straight leg raise. Incision well healing. ROM 0-80 degrees. Neurovascularly intact. actively can extend the knee. No signs of infection. Stable to varus/valgus stress. Stable to anterior/posterior drawer. Negative vásquez.     Procedures    Imaging Results (Most Recent)     None           Result Review :       No results found.           Assessment and Plan     DX: S/P Left Quadriceps Tendon Repair     Discussed the treatment plan with the patient.  Plan to continue the ACL brace and physical therapy.     Call or return if worsening symptoms.    Follow Up     4 weeks      Patient was given instructions and counseling regarding his condition or for health maintenance advice. Please see specific information pulled into the AVS if appropriate.     Scribed for Luis Miguel Amezcua MD by Cristiane Cid.  10/01/21   11:20 EDT    I have personally performed the services described in this document as scribed by the above individual and it is both accurate and complete. Luis Miguel Amezcua MD 10/03/21

## 2021-10-02 ENCOUNTER — FLU SHOT (OUTPATIENT)
Dept: INTERNAL MEDICINE | Facility: CLINIC | Age: 41
End: 2021-10-02

## 2021-10-02 DIAGNOSIS — Z23 NEED FOR INFLUENZA VACCINATION: Primary | ICD-10-CM

## 2021-10-02 PROCEDURE — 90471 IMMUNIZATION ADMIN: CPT | Performed by: INTERNAL MEDICINE

## 2021-10-02 PROCEDURE — 90686 IIV4 VACC NO PRSV 0.5 ML IM: CPT | Performed by: INTERNAL MEDICINE

## 2021-10-05 ENCOUNTER — TREATMENT (OUTPATIENT)
Dept: PHYSICAL THERAPY | Facility: CLINIC | Age: 41
End: 2021-10-05

## 2021-10-05 DIAGNOSIS — Z98.890 S/P LEFT KNEE SURGERY: Primary | ICD-10-CM

## 2021-10-05 DIAGNOSIS — M25.562 LEFT KNEE PAIN, UNSPECIFIED CHRONICITY: ICD-10-CM

## 2021-10-05 DIAGNOSIS — R26.9 GAIT DISTURBANCE: ICD-10-CM

## 2021-10-05 PROCEDURE — 97110 THERAPEUTIC EXERCISES: CPT | Performed by: PHYSICAL THERAPIST

## 2021-10-05 NOTE — PROGRESS NOTES
Physical Therapy Daily Treatment Note      Patient: Saul Morfin   : 1980  Referring practitioner: Luis Miguel Amezcua MD  Date of Initial Visit: Type: THERAPY  Noted: 9/15/2021  Today's Date: 10/5/2021  Patient seen for 6 sessions         Saul Morfin reports: 5/10 pain today. He saw the Dr. And unlocked brace to 40 deg. He says he feels ready to go back to work. He presents today without any AD.       Subjective     Objective   L knee AROM: 3-87 supine   See Exercise, Manual, and Modality Logs for complete treatment.       Assessment & Plan     Assessment  Assessment details: Pt tolerated progression of exericses within pain tolerable range. Pt demonstrate mild extensor lag with SAQ and had increased pain along the anterior knee. Progress patient next visit per protocol as tolerated.         Visit Diagnoses:    ICD-10-CM ICD-9-CM   1. S/P left knee surgery  Z98.890 V45.89   2. Left knee pain, unspecified chronicity  M25.562 719.46   3. Gait disturbance  R26.9 781.2       Progress per Plan of Care           Timed:         Manual Therapy:    0     mins  16866;     Therapeutic Exercise:    30     mins  06790;     Neuromuscular Johnnie:    0    mins  50957;    Therapeutic Activity:     0     mins  32938;     Gait Trainin     mins  24825;     Ultrasound:     0     mins  08012;    Ionto                               0    mins   85981  Self-care  __0__ mins 49430    Un-Timed:  Electrical Stimulation:    0     mins  02601 (MC );  Traction     0     mins 68009  Hot pack     0     Mins    Cold pack                       0     Mins      Timed Treatment:   30   mins   Total Treatment:     30   mins    Jazmin Rivera PT, DPT  Physical Therapist

## 2021-10-07 ENCOUNTER — TREATMENT (OUTPATIENT)
Dept: PHYSICAL THERAPY | Facility: CLINIC | Age: 41
End: 2021-10-07

## 2021-10-07 DIAGNOSIS — Z98.890 S/P LEFT KNEE SURGERY: Primary | ICD-10-CM

## 2021-10-07 DIAGNOSIS — M25.562 LEFT KNEE PAIN, UNSPECIFIED CHRONICITY: ICD-10-CM

## 2021-10-07 DIAGNOSIS — R26.9 GAIT DISTURBANCE: ICD-10-CM

## 2021-10-07 PROCEDURE — 97110 THERAPEUTIC EXERCISES: CPT | Performed by: PHYSICAL THERAPIST

## 2021-10-07 NOTE — PROGRESS NOTES
Physical Therapy Daily Treatment Note      Patient: Saul Morfin   : 1980  Referring practitioner: Luis Miguel Amezcua MD  Date of Initial Visit: Type: THERAPY  Noted: 9/15/2021  Today's Date: 10/7/2021  Patient seen for 7 sessions         Saul Morfin reports: reports L knee is painful along the area where there is one stitch left.       Subjective     Objective   See Exercise, Manual, and Modality Logs for complete treatment.       Assessment & Plan     Assessment  Assessment details: Pt tolerated session and is progressing per protocol. Pt continues to have anterior knee pain and weakness with mobility and strengthening exercises.         Visit Diagnoses:    ICD-10-CM ICD-9-CM   1. S/P left knee surgery  Z98.890 V45.89   2. Left knee pain, unspecified chronicity  M25.562 719.46   3. Gait disturbance  R26.9 781.2       Progress per Plan of Care           Timed:         Manual Therapy:    0     mins  36851;     Therapeutic Exercise:    30     mins  12612;     Neuromuscular Johnnie:    0    mins  77485;    Therapeutic Activity:     0     mins  28489;     Gait Trainin     mins  32729;     Ultrasound:     0     mins  54144;    Ionto                               0    mins   19260  Self-care  __0__ mins 69307    Un-Timed:  Electrical Stimulation:    0     mins  07470 (MC );  Traction     0     mins 20947  Hot pack     0     Mins    Cold pack                       0     Mins      Timed Treatment:   30   mins   Total Treatment:     30   mins    Jazmin Rivera PT, DPT  Physical Therapist

## 2021-10-12 ENCOUNTER — TREATMENT (OUTPATIENT)
Dept: PHYSICAL THERAPY | Facility: CLINIC | Age: 41
End: 2021-10-12

## 2021-10-12 DIAGNOSIS — Z98.890 S/P LEFT KNEE SURGERY: Primary | ICD-10-CM

## 2021-10-12 DIAGNOSIS — M25.562 LEFT KNEE PAIN, UNSPECIFIED CHRONICITY: ICD-10-CM

## 2021-10-12 DIAGNOSIS — R26.9 GAIT DISTURBANCE: ICD-10-CM

## 2021-10-12 PROCEDURE — 97110 THERAPEUTIC EXERCISES: CPT | Performed by: PHYSICAL THERAPIST

## 2021-10-12 NOTE — PROGRESS NOTES
Physical Therapy Daily Treatment Note      Patient: Saul Morfin   : 1980  Referring practitioner: Luis Miguel Amezcua MD  Date of Initial Visit: Type: THERAPY  Noted: 9/15/2021  Today's Date: 10/12/2021  Patient seen for 8 sessions         Saul Morfin reports: no pain today but does continue to experience occasional tenderness and pain along the scar area.       Subjective     Objective   L knee PROM: 0-105 *painful at end range flexion*  L knee AROM: 0-90  See Exercise, Manual, and Modality Logs for complete treatment.       Assessment & Plan     Assessment  Assessment details: Pt tolerated session is progressing exercises and ROM indicating improved L knee function. Pt is limited due to decreased knee mobility, pain, and protocol. Progress next visit as tolerated per protocol.         Visit Diagnoses:    ICD-10-CM ICD-9-CM   1. S/P left knee surgery  Z98.890 V45.89   2. Left knee pain, unspecified chronicity  M25.562 719.46   3. Gait disturbance  R26.9 781.2       Progress per Plan of Care           Timed:         Manual Therapy:    0     mins  83475;     Therapeutic Exercise:    30     mins  42289;     Neuromuscular Johnnie:    0    mins  64738;    Therapeutic Activity:     0     mins  19649;     Gait Trainin     mins  51154;     Ultrasound:     0     mins  28923;    Ionto                               0    mins   92776  Self-care  __0__ mins 79570    Un-Timed:  Electrical Stimulation:    0     mins  87849 ( );  Traction     0     mins 86177  Hot pack     0     Mins    Cold pack                       0     Mins      Timed Treatment:   30   mins   Total Treatment:     30   mins    Jazmin Rivera PT, DPT  Physical Therapist

## 2021-10-14 ENCOUNTER — TREATMENT (OUTPATIENT)
Dept: PHYSICAL THERAPY | Facility: CLINIC | Age: 41
End: 2021-10-14

## 2021-10-14 DIAGNOSIS — Z98.890 S/P LEFT KNEE SURGERY: Primary | ICD-10-CM

## 2021-10-14 DIAGNOSIS — R26.9 GAIT DISTURBANCE: ICD-10-CM

## 2021-10-14 DIAGNOSIS — M25.562 LEFT KNEE PAIN, UNSPECIFIED CHRONICITY: ICD-10-CM

## 2021-10-14 PROCEDURE — 97110 THERAPEUTIC EXERCISES: CPT | Performed by: PHYSICAL THERAPIST

## 2021-10-14 NOTE — PROGRESS NOTES
Re-Assessment / Re-Certification      Patient: Saul Morfin   : 1980  Diagnosis/ICD-10 Code:  S/P left knee surgery [Z98.890]  Referring practitioner: Luis Miguel Amezcua MD  Date of Initial Visit: Type: THERAPY  Noted: 9/15/2021  Today's Date: 10/14/2021  Patient seen for 9 sessions    Progress note due: 2021  Re-cert due: 2022    Subjective:     Subjective Questionnaire: LEFS: 39/80  Clinical Progress: improved  Home Program Compliance: Yes  Treatment has included: therapeutic exercise and manual therapy    Subjective Evaluation    Pain  Current pain ratin         Objective   Objective            Active Range of Motion      Right Knee   Flexion: 135 degrees   Extension: 0 degrees         Left Knee   Flexion: 105 degrees   Extension: 0 degrees      Patellar Mobility   Left Knee Hypomobile in the left medial, left lateral, left superior and left inferior patellar tendon(s).      Right Knee Patellar tendons within functional limits include the medial, lateral, superior and inferior.      Strength/Myotome Testing      Left Hip   Planes of Motion   Flexion: 4  Abduction: 4  Adduction: 4     Right Hip   Planes of Motion   Flexion: 4+  Abduction: 4+  Adduction: 4+     Left Knee   Flexion: 4-/5  Ext: 4-/5  Quadriceps contraction: good       Right Knee   Flexion: 5  Extension: 5  Quadriceps contraction: good     Left Ankle/Foot   Dorsiflexion: 5  Plantar flexion: 5     Right Ankle/Foot   Dorsiflexion: 5  Plantar flexion: 5     Swelling      Left Knee Girth Measurement (cm)   Joint line: 50 cm     Right Knee Girth Measurement (cm)   Joint line: 45 cm     Ambulation   Weight-Bearing Status   Weight-Bearing Status (Left): weight-bearing as tolerated   Weight-Bearing Status (Right): weight-bearing as tolerated    Assistive device used: crutches     Observational Gait   Gait: antalgic   Decreased walking speed, stride length, left stance time, left swing time and left step length.    Assessment & Plan      Assessment  Impairments: abnormal gait, abnormal or restricted ROM, activity intolerance, impaired balance, impaired physical strength, pain with function and weight-bearing intolerance  Assessment details:  Pt presents s/p L quad tendon repair and presents with continued L knee mobility, strength, and gait deficits. However pt demonstrates improvements compared to initial evaluation as measured objectively.  Pt will benefit from skilled PT to address functional deficits and return to PLOF.   Functional Limitations: walking, uncomfortable because of pain, standing and unable to perform repetitive tasks  Goals  Plan Goals: Plan Goals: KNEE PROBLEMS:     1. The patient has limited ROM of the L knee.              LTG 1: 12 weeks:  The patient will demonstrate 0 to 135 degrees of ROM for the L knee in order to allow patient to complete prolonged walking, standing, stairs and other ADLs with decreased pain/difficulty.                          STATUS:  ongoing              STG 1a:  6 weeks:  The patient will demonstrate 0 to 100 degrees of ROM for the L knee.                          STATUS:  met              TREATMENT: Manual therapy, therapeutic exercise, home exercise instruction, and modalities as needed to include:  moist heat, electrical stimulation, ultrasound, and ice.    2. The patient has limited strength of the L knee.              LTG 2: 12 weeks: The patient will demonstrate 5/5 strength for L knee flexion and extension in order to allow patient improved joint stability                          STATUS: ongoing              STG 2a: 6 weeks: The patient will demonstrate 4/5 strength for L knee flexion and extension                          STATUS:  ongoing              TREATMENT: Manual therapy, therapeutic exercise, home exercise instruction, aquatic therapy, and modalities as needed to include:  moist heat, electrical stimulation, ultrasound, and ice.                3. The patient has gait dysfunction.               LTG 3: 12 weeks:  The patient will ambulate without assistive device, independently, for community distances with minimal limp to the L lower extremity in order to improve mobility and allow patient to perform activities such as grocery shopping with greater ease.                          STATUS:  ongoing              TREATMENT: Gait training, aquatic therapy, therapeutic exercise, and home exercise instruction.    4. Mobility: Walking/Moving Around Functional Limitation                               LTG 4: 12 weeks:  The patient will demonstrate  LEFS score of 70 in order to decrease limitations.                          STATUS: ongoing              STG 4 a: 6weeks:  The patient will demonstrate  LEFS score of 50 in order to decrease limitations.                          STATUS: ongoing              TREATMENT:  Manual therapy, therapeutic exercise, home exercise instruction.     Plan  Therapy options: will be seen for skilled physical therapy services  Planned modality interventions: cryotherapy, TENS and thermotherapy (hydrocollator packs)  Planned therapy interventions: balance/weight-bearing training, flexibility, functional ROM exercises, gait training, home exercise program, joint mobilization, manual therapy, neuromuscular re-education, soft tissue mobilization, strengthening, stretching and therapeutic activities        Visit Diagnoses:    ICD-10-CM ICD-9-CM   1. S/P left knee surgery  Z98.890 V45.89   2. Left knee pain, unspecified chronicity  M25.562 719.46   3. Gait disturbance  R26.9 781.2       Progress toward previous goals: Not Met    Recommendations: Continue as planned  Timeframe: 2 months  Prognosis to achieve goals: good    PT Signature: Jazmin Rivera PT, DPT      Based upon review of the patient's progress and continued therapy plan, it is my medical opinion that Saul Morfin should continue physical therapy treatment at [facility]  [address]  [place] [address]  [phone].    Signature: __________________________________  Luis Miguel Amezcua MD    Timed:         Manual Therapy:    0     mins  50445;     Therapeutic Exercise:    30     mins  68447;     Neuromuscular Johnnie:    0    mins  62780;    Therapeutic Activity:     0     mins  87299;     Gait Trainin     mins  63428;     Ultrasound:     0     mins  86032;    Ionto                               0    mins   93246  Self-care  __0__ mins 43713    Un-Timed:  Electrical Stimulation:    0     mins  09810 (MC );  Traction     0     mins 32578  Re- Eval     8     Mins     Hot pack     0     Mins    Cold pack                       0     Mins      Timed Treatment:   30   mins   Total Treatment:     38   mins

## 2021-11-02 ENCOUNTER — OFFICE VISIT (OUTPATIENT)
Dept: ORTHOPEDIC SURGERY | Facility: CLINIC | Age: 41
End: 2021-11-02

## 2021-11-02 VITALS — HEIGHT: 72 IN | HEART RATE: 88 BPM | OXYGEN SATURATION: 94 % | WEIGHT: 290 LBS | BODY MASS INDEX: 39.28 KG/M2

## 2021-11-02 DIAGNOSIS — F41.9 ANXIETY: ICD-10-CM

## 2021-11-02 DIAGNOSIS — F32.A DEPRESSION, UNSPECIFIED DEPRESSION TYPE: ICD-10-CM

## 2021-11-02 DIAGNOSIS — Z98.890 S/P KNEE SURGERY: Primary | ICD-10-CM

## 2021-11-02 PROCEDURE — 99024 POSTOP FOLLOW-UP VISIT: CPT | Performed by: ORTHOPAEDIC SURGERY

## 2021-11-02 NOTE — PROGRESS NOTES
"Chief Complaint  Pain of the Left Knee     Subjective      Saul Morfin presents to [facility] for follow up evaluation of the left knee. The patient is S/P Left Quadriceps Tendon Repair 8/20/21.  He has been in an ACL brace. He states he isn't having much pain. He is overall doing well. he has been attending physical therapy.     No Known Allergies     Social History     Socioeconomic History   • Marital status:    Tobacco Use   • Smoking status: Never Smoker   • Smokeless tobacco: Never Used   Vaping Use   • Vaping Use: Never used   Substance and Sexual Activity   • Alcohol use: Yes     Comment: OCCASIONALLY DRINKS, LESS THAN 1 DRINKS PER DAY, HAS BEEN DRINKING FOR 21-30 YEARS    • Drug use: Not Currently     Comment: IN THE PAST         Review of Systems     Objective   Vital Signs:   Pulse 88   Ht 182.9 cm (72\")   Wt 132 kg (290 lb)   SpO2 94%   BMI 39.33 kg/m²       Physical Exam  Constitutional:       Appearance: Normal appearance. The patient is well-developed and normal weight.   HENT:      Head: Normocephalic.      Right Ear: Hearing and external ear normal.      Left Ear: Hearing and external ear normal.      Nose: Nose normal.   Eyes:      Conjunctiva/sclera: Conjunctivae normal.   Cardiovascular:      Rate and Rhythm: Normal rate.   Pulmonary:      Effort: Pulmonary effort is normal.      Breath sounds: No wheezing or rales.   Abdominal:      Palpations: Abdomen is soft.      Tenderness: There is no abdominal tenderness.   Musculoskeletal:      Cervical back: Normal range of motion.   Skin:     Findings: No rash.   Neurological:      Mental Status: The patient is alert and oriented to person, place, and time.   Psychiatric:         Mood and Affect: Mood and affect normal.         Judgment: Judgment normal.       Ortho Exam      Left knee 5 degrees extensor lag. ROM 0-105 degrees. Neurovascularly intact. Can hold straight leg raise. Stable to varus/valgus " stress. Stable to anterior/posterior drawer. Patella well tracking.     Procedures      Imaging Results (Most Recent)     None           Result Review :       No results found.           Assessment and Plan     DX: S/P Left Quadriceps Tendon Repair 8/20/21    Discussed the treatment plan with the patient.  Plan to continue physical therapy. He was given a hinge knee brace today.     Call or return if worsening symptoms.    Follow Up     6 weeks      Patient was given instructions and counseling regarding his condition or for health maintenance advice. Please see specific information pulled into the AVS if appropriate.     Scribed for Luis Miguel Amezcua MD by Cristiane Cid.  11/02/21   09:14 EDT    I have personally performed the services described in this document as scribed by the above individual and it is both accurate and complete. Luis Miguel Amezcua MD 11/02/21

## 2021-11-03 RX ORDER — ARIPIPRAZOLE 2 MG/1
2 TABLET ORAL DAILY
Qty: 30 TABLET | Refills: 0 | Status: SHIPPED | OUTPATIENT
Start: 2021-11-03 | End: 2021-12-09 | Stop reason: SDUPTHER

## 2021-11-03 RX ORDER — VENLAFAXINE 75 MG/1
75 TABLET ORAL 2 TIMES DAILY
Qty: 60 TABLET | Refills: 1 | Status: SHIPPED | OUTPATIENT
Start: 2021-11-03 | End: 2021-12-28 | Stop reason: SDUPTHER

## 2021-11-04 NOTE — TELEPHONE ENCOUNTER
Patient is requesting a refill. His LOV was 8/17/21 with [facility] and last RF was 06/24/21. Is it okay to send in it so can you send in please.

## 2021-11-05 ENCOUNTER — TREATMENT (OUTPATIENT)
Dept: PHYSICAL THERAPY | Facility: CLINIC | Age: 41
End: 2021-11-05

## 2021-11-05 DIAGNOSIS — M25.562 LEFT KNEE PAIN, UNSPECIFIED CHRONICITY: ICD-10-CM

## 2021-11-05 DIAGNOSIS — R26.9 GAIT DISTURBANCE: ICD-10-CM

## 2021-11-05 DIAGNOSIS — Z98.890 S/P LEFT KNEE SURGERY: Primary | ICD-10-CM

## 2021-11-05 PROCEDURE — 97110 THERAPEUTIC EXERCISES: CPT | Performed by: PHYSICAL THERAPIST

## 2021-11-05 RX ORDER — BUPROPION HYDROCHLORIDE 150 MG/1
150 TABLET, EXTENDED RELEASE ORAL 3 TIMES DAILY
Qty: 90 TABLET | Refills: 3 | Status: SHIPPED | OUTPATIENT
Start: 2021-11-05 | End: 2022-03-22 | Stop reason: SDUPTHER

## 2021-11-05 RX ORDER — CHOLECALCIFEROL (VITAMIN D3) 125 MCG
2000 CAPSULE ORAL DAILY
Qty: 30 TABLET | Refills: 3 | Status: SHIPPED | OUTPATIENT
Start: 2021-11-05 | End: 2022-03-22 | Stop reason: SDUPTHER

## 2021-11-05 RX ORDER — BUSPIRONE HYDROCHLORIDE 10 MG/1
10 TABLET ORAL 2 TIMES DAILY
Qty: 60 TABLET | Refills: 3 | Status: SHIPPED | OUTPATIENT
Start: 2021-11-05 | End: 2022-03-22 | Stop reason: SDUPTHER

## 2021-11-05 NOTE — PROGRESS NOTES
Re-Assessment / Re-Certification      Patient: Saul Morfin   : 1980  Diagnosis/ICD-10 Code:  S/P left knee surgery [Z98.890]  Referring practitioner: Luis Miguel Amezcua MD  Date of Initial Visit: Type: THERAPY  Noted: 9/15/2021  Today's Date: 2021  Patient seen for 10 sessions    Progress note due: 2021  Re-cert due: 2/3/2022    Subjective:     Subjective Questionnaire: LEFS: 54/80  Clinical Progress: improved  Home Program Compliance: Yes  Treatment has included: therapeutic exercise, manual therapy and gait training    Subjective   Objective   Active Range of Motion      Right Knee   Flexion: 135 degrees   Extension: 0 degrees         Left Knee   Flexion: 125 degrees   Extension: 0 degrees      Patellar Mobility   Left Knee Hypomobile in the left medial, left lateral, left superior and left inferior patellar tendon(s).      Right Knee Patellar tendons within functional limits include the medial, lateral, superior and inferior.      Strength/Myotome Testing      Left Hip   Planes of Motion   Flexion: 4  Abduction: 4  Adduction: 4     Right Hip   Planes of Motion   Flexion: 4+  Abduction: 4+  Adduction: 4+     Left Knee   Flexion: 4+/5  Ext: 4+/5  Quadriceps contraction: good        Right Knee   Flexion: 5  Extension: 5  Quadriceps contraction: good     Left Ankle/Foot   Dorsiflexion: 5  Plantar flexion: 5     Right Ankle/Foot   Dorsiflexion: 5  Plantar flexion: 5    Ambulation   Weight-Bearing Status   Weight-Bearing Status (Left): weight-bearing as tolerated   Weight-Bearing Status (Right): weight-bearing as tolerated    Assistive device used: crutches     Observational Gait   Gait: antalgic   Decreased walking speed, stride length, left stance time, left swing time and left step length.    Assessment & Plan     Assessment  Impairments: abnormal gait, abnormal or restricted ROM, activity intolerance, impaired balance, impaired physical strength, pain with function and weight-bearing  intolerance  Assessment details: Pt presents s/p L quad tendon repair and presents with continued L knee mobility, strength, and gait deficits. However pt demonstrates improvements compared to initial evaluation as measured objectively.  Pt will benefit from skilled PT to address functional deficits and return to PLOF.   Functional Limitations: walking, uncomfortable because of pain, standing and unable to perform repetitive tasks  Goals  Plan Goals: Plan Goals: Plan Goals: KNEE PROBLEMS:     1. The patient has limited ROM of the L knee.              LTG 1: 12 weeks:  The patient will demonstrate 0 to 135 degrees of ROM for the L knee in order to allow patient to complete prolonged walking, standing, stairs and other ADLs with decreased pain/difficulty.                          STATUS:  ongoing              STG 1a:  6 weeks:  The patient will demonstrate 0 to 100 degrees of ROM for the L knee.                          STATUS:  met              TREATMENT: Manual therapy, therapeutic exercise, home exercise instruction, and modalities as needed to include:  moist heat, electrical stimulation, ultrasound, and ice.    2. The patient has limited strength of the L knee.              LTG 2: 12 weeks: The patient will demonstrate 5/5 strength for L knee flexion and extension in order to allow patient improved joint stability                          STATUS: ongoing              STG 2a: 6 weeks: The patient will demonstrate 4/5 strength for L knee flexion and extension                          STATUS:  ongoing              TREATMENT: Manual therapy, therapeutic exercise, home exercise instruction, aquatic therapy, and modalities as needed to include:  moist heat, electrical stimulation, ultrasound, and ice.                3. The patient has gait dysfunction.              LTG 3: 12 weeks:  The patient will ambulate without assistive device, independently, for community distances with minimal limp to the L lower extremity in  order to improve mobility and allow patient to perform activities such as grocery shopping with greater ease.                          STATUS:  ongoing              TREATMENT: Gait training, aquatic therapy, therapeutic exercise, and home exercise instruction.    4. Mobility: Walking/Moving Around Functional Limitation                               LTG 4: 12 weeks:  The patient will demonstrate  LEFS score of 70 in order to decrease limitations.                          STATUS: ongoing              STG 4 a: 6weeks:  The patient will demonstrate  LEFS score of 50 in order to decrease limitations.                          STATUS: MET              TREATMENT:  Manual therapy, therapeutic exercise, home exercise instruction.     Plan  Therapy options: will be seen for skilled physical therapy services  Planned modality interventions: cryotherapy, TENS and thermotherapy (hydrocollator packs)  Planned therapy interventions: balance/weight-bearing training, flexibility, functional ROM exercises, gait training, home exercise program, joint mobilization, manual therapy, neuromuscular re-education, soft tissue mobilization, strengthening, stretching and therapeutic activities  Frequency: 2x week  Duration in weeks: 8        Visit Diagnoses:    ICD-10-CM ICD-9-CM   1. S/P left knee surgery  Z98.890 V45.89   2. Left knee pain, unspecified chronicity  M25.562 719.46   3. Gait disturbance  R26.9 781.2       Progress toward previous goals: Partially Met    Recommendations: Continue as planned  Timeframe: 6 weeks  Prognosis to achieve goals: good    PT Signature: Jazmin Rivera PT, DPT      Based upon review of the patient's progress and continued therapy plan, it is my medical opinion that Saul Morfin should continue physical therapy treatment at [facility]  [address] [place] [id number] [phone].    Signature: __________________________________  Luis Miguel Amezcua  MD    Timed:         Manual Therapy:    0     mins  91107;     Therapeutic Exercise:    23     mins  21761;     Neuromuscular Johnnie:    0    mins  72488;    Therapeutic Activity:     0     mins  41100;     Gait Trainin     mins  04211;     Ultrasound:     0     mins  94831;    Ionto                               0    mins   50732  Self-care  __0__ mins 90483    Un-Timed:  Electrical Stimulation:    0     mins  13725 (MC );  Traction     0     mins 83544  Re- Eval     7     Mins     Hot pack     0     Mins    Cold pack                       0     Mins      Timed Treatment:   23   mins   Total Treatment:     30   mins

## 2021-11-17 ENCOUNTER — OFFICE VISIT (OUTPATIENT)
Dept: INTERNAL MEDICINE | Facility: CLINIC | Age: 41
End: 2021-11-17

## 2021-11-17 VITALS
BODY MASS INDEX: 40.66 KG/M2 | RESPIRATION RATE: 18 BRPM | TEMPERATURE: 97.8 F | OXYGEN SATURATION: 98 % | HEART RATE: 120 BPM | WEIGHT: 300.2 LBS | SYSTOLIC BLOOD PRESSURE: 130 MMHG | DIASTOLIC BLOOD PRESSURE: 90 MMHG | HEIGHT: 72 IN

## 2021-11-17 DIAGNOSIS — E55.9 VITAMIN D DEFICIENCY: ICD-10-CM

## 2021-11-17 DIAGNOSIS — Z13.220 SCREENING FOR LIPID DISORDERS: ICD-10-CM

## 2021-11-17 DIAGNOSIS — F31.0 BIPOLAR AFFECTIVE DISORDER, CURRENT EPISODE HYPOMANIC (HCC): Primary | ICD-10-CM

## 2021-11-17 DIAGNOSIS — G47.00 INSOMNIA, UNSPECIFIED TYPE: ICD-10-CM

## 2021-11-17 PROBLEM — F33.1 MAJOR DEPRESSIVE DISORDER, RECURRENT EPISODE, MODERATE DEGREE: Status: RESOLVED | Noted: 2021-07-19 | Resolved: 2021-11-17

## 2021-11-17 PROBLEM — F41.1 GENERALIZED ANXIETY DISORDER: Status: RESOLVED | Noted: 2021-07-19 | Resolved: 2021-11-17

## 2021-11-17 LAB
25(OH)D3 SERPL-MCNC: 38.8 NG/ML (ref 30–100)
ALBUMIN SERPL-MCNC: 4.6 G/DL (ref 3.5–5.2)
ALBUMIN/GLOB SERPL: 1.6 G/DL
ALP SERPL-CCNC: 75 U/L (ref 39–117)
ALT SERPL W P-5'-P-CCNC: 30 U/L (ref 1–41)
ANION GAP SERPL CALCULATED.3IONS-SCNC: 9.7 MMOL/L (ref 5–15)
AST SERPL-CCNC: 24 U/L (ref 1–40)
BASOPHILS # BLD AUTO: 0.06 10*3/MM3 (ref 0–0.2)
BASOPHILS NFR BLD AUTO: 0.8 % (ref 0–1.5)
BILIRUB SERPL-MCNC: 0.3 MG/DL (ref 0–1.2)
BUN SERPL-MCNC: 16 MG/DL (ref 6–20)
BUN/CREAT SERPL: 10.6 (ref 7–25)
CALCIUM SPEC-SCNC: 9.4 MG/DL (ref 8.6–10.5)
CHLORIDE SERPL-SCNC: 102 MMOL/L (ref 98–107)
CHOLEST SERPL-MCNC: 153 MG/DL (ref 0–200)
CO2 SERPL-SCNC: 26.3 MMOL/L (ref 22–29)
CREAT SERPL-MCNC: 1.51 MG/DL (ref 0.76–1.27)
DEPRECATED RDW RBC AUTO: 39.4 FL (ref 37–54)
EOSINOPHIL # BLD AUTO: 0.14 10*3/MM3 (ref 0–0.4)
EOSINOPHIL NFR BLD AUTO: 1.9 % (ref 0.3–6.2)
ERYTHROCYTE [DISTWIDTH] IN BLOOD BY AUTOMATED COUNT: 13.3 % (ref 12.3–15.4)
GFR SERPL CREATININE-BSD FRML MDRD: 51 ML/MIN/1.73
GLOBULIN UR ELPH-MCNC: 2.8 GM/DL
GLUCOSE SERPL-MCNC: 93 MG/DL (ref 65–99)
HCT VFR BLD AUTO: 40.3 % (ref 37.5–51)
HDLC SERPL-MCNC: 36 MG/DL (ref 40–60)
HGB BLD-MCNC: 14 G/DL (ref 13–17.7)
IMM GRANULOCYTES # BLD AUTO: 0.04 10*3/MM3 (ref 0–0.05)
IMM GRANULOCYTES NFR BLD AUTO: 0.5 % (ref 0–0.5)
LDLC SERPL CALC-MCNC: 86 MG/DL (ref 0–100)
LDLC/HDLC SERPL: 2.24 {RATIO}
LYMPHOCYTES # BLD AUTO: 1.65 10*3/MM3 (ref 0.7–3.1)
LYMPHOCYTES NFR BLD AUTO: 22.2 % (ref 19.6–45.3)
MCH RBC QN AUTO: 28.5 PG (ref 26.6–33)
MCHC RBC AUTO-ENTMCNC: 34.7 G/DL (ref 31.5–35.7)
MCV RBC AUTO: 82.1 FL (ref 79–97)
MONOCYTES # BLD AUTO: 0.42 10*3/MM3 (ref 0.1–0.9)
MONOCYTES NFR BLD AUTO: 5.7 % (ref 5–12)
NEUTROPHILS NFR BLD AUTO: 5.11 10*3/MM3 (ref 1.7–7)
NEUTROPHILS NFR BLD AUTO: 68.9 % (ref 42.7–76)
NRBC BLD AUTO-RTO: 0 /100 WBC (ref 0–0.2)
PLATELET # BLD AUTO: 257 10*3/MM3 (ref 140–450)
PMV BLD AUTO: 11 FL (ref 6–12)
POTASSIUM SERPL-SCNC: 4.1 MMOL/L (ref 3.5–5.2)
PROT SERPL-MCNC: 7.4 G/DL (ref 6–8.5)
RBC # BLD AUTO: 4.91 10*6/MM3 (ref 4.14–5.8)
SODIUM SERPL-SCNC: 138 MMOL/L (ref 136–145)
TRIGL SERPL-MCNC: 181 MG/DL (ref 0–150)
TSH SERPL DL<=0.05 MIU/L-ACNC: 1.13 UIU/ML (ref 0.27–4.2)
VLDLC SERPL-MCNC: 31 MG/DL (ref 5–40)
WBC NRBC COR # BLD: 7.42 10*3/MM3 (ref 3.4–10.8)

## 2021-11-17 PROCEDURE — 82306 VITAMIN D 25 HYDROXY: CPT | Performed by: NURSE PRACTITIONER

## 2021-11-17 PROCEDURE — 85025 COMPLETE CBC W/AUTO DIFF WBC: CPT | Performed by: NURSE PRACTITIONER

## 2021-11-17 PROCEDURE — 84443 ASSAY THYROID STIM HORMONE: CPT | Performed by: NURSE PRACTITIONER

## 2021-11-17 PROCEDURE — 99213 OFFICE O/P EST LOW 20 MIN: CPT | Performed by: NURSE PRACTITIONER

## 2021-11-17 PROCEDURE — 80061 LIPID PANEL: CPT | Performed by: NURSE PRACTITIONER

## 2021-11-17 PROCEDURE — 36415 COLL VENOUS BLD VENIPUNCTURE: CPT | Performed by: NURSE PRACTITIONER

## 2021-11-17 PROCEDURE — 80053 COMPREHEN METABOLIC PANEL: CPT | Performed by: NURSE PRACTITIONER

## 2021-11-17 RX ORDER — TRAZODONE HYDROCHLORIDE 150 MG/1
150 TABLET ORAL NIGHTLY
Qty: 90 TABLET | Refills: 1 | Status: SHIPPED | OUTPATIENT
Start: 2021-11-17 | End: 2022-03-22 | Stop reason: SDUPTHER

## 2021-11-17 NOTE — ASSESSMENT & PLAN NOTE
Continue Abilify, Wellbutrin, BuSpar, Effexor, trazodone.  Follow-up with psychiatry as scheduled.  Patient to continue to monitor and will seek medical attention immediately if he feels that his mental health is deteriorating.  Denies SI/HI.  Will continue to monitor.

## 2021-11-17 NOTE — PROGRESS NOTES
"Chief Complaint  Follow-up and Med Refill    Subjective          Saul Morfin presents to [facility]  Bipolar-  Currently managed with Wellbutrin, BuSpar, trazodone, Abilify, Effexor.  Patient follows with psychiatry, feels that mood is well controlled at this time.  Denies SI/HI.    ED-  Well-controlled with Viagra.    Vitamin D deficiency-  Managed with oral supplement.    Influenza vaccination: Up to date  COVID-19 vaccination: Up to date  Colonoscopy: Denies family history of colon cancer  PSA: Denies family history of prostate cancer        Objective   Vital Signs:   /90 (BP Location: Left arm, Patient Position: Sitting, Cuff Size: Adult)   Pulse 120   Temp 97.8 °F (36.6 °C) (Temporal)   Resp 18   Ht 182.9 cm (72\")   Wt (!) 136 kg (300 lb 3.2 oz)   SpO2 98%   BMI 40.71 kg/m²     Physical Exam  Constitutional:       Appearance: Normal appearance. He is normal weight.   HENT:      Head: Normocephalic and atraumatic.      Nose: Nose normal.      Mouth/Throat:      Mouth: Mucous membranes are moist.      Pharynx: Oropharynx is clear.   Eyes:      Extraocular Movements: Extraocular movements intact.      Conjunctiva/sclera: Conjunctivae normal.      Pupils: Pupils are equal, round, and reactive to light.   Neck:      Thyroid: No thyroid mass, thyromegaly or thyroid tenderness.   Cardiovascular:      Rate and Rhythm: Normal rate and regular rhythm.      Heart sounds: Normal heart sounds.   Pulmonary:      Effort: Pulmonary effort is normal.      Breath sounds: Normal breath sounds.   Skin:     General: Skin is warm and dry.   Neurological:      General: No focal deficit present.      Mental Status: He is alert and oriented to person, place, and time.   Psychiatric:         Mood and Affect: Mood normal.         Behavior: Behavior normal.         Thought Content: Thought content normal.        Result Review :                 Assessment and Plan {CC " Problem List  Visit Diagnosis   ROS  Review (Popup)  Health Maintenance  Quality  BestPractice  Medications  SmartSets  SnapShot Encounters  Media :23}   Diagnoses and all orders for this visit:    1. Bipolar affective disorder, current episode hypomanic (HCC) (Primary)  Assessment & Plan:  Continue Abilify, Wellbutrin, BuSpar, Effexor, trazodone.  Follow-up with psychiatry as scheduled.  Patient to continue to monitor and will seek medical attention immediately if he feels that his mental health is deteriorating.  Denies SI/HI.  Will continue to monitor.    Orders:  -     Vitamin D 25 hydroxy  -     Comprehensive Metabolic Panel  -     CBC & Differential  -     TSH  -     Lipid Panel    2. Vitamin D deficiency  Comments:  Continue vitamin D supplement, vitamin D level in clinic today.  Orders:  -     Vitamin D 25 hydroxy  -     Comprehensive Metabolic Panel  -     CBC & Differential  -     TSH  -     Lipid Panel    3. Insomnia, unspecified type  -     Vitamin D 25 hydroxy  -     Comprehensive Metabolic Panel  -     TSH  -     Lipid Panel    4. Screening for lipid disorders  Comments:  Lipid panel in clinic today.  Orders:  -     Lipid Panel    Other orders  -     traZODone (DESYREL) 150 MG tablet; Take 1 tablet by mouth Every Night.  Dispense: 90 tablet; Refill: 1      Follow Up   Return in about 3 months (around 2/17/2022).  Patient was given instructions and counseling regarding his condition or for health maintenance advice. Please see specific information pulled into the AVS if appropriate.

## 2021-11-19 ENCOUNTER — TREATMENT (OUTPATIENT)
Dept: PHYSICAL THERAPY | Facility: CLINIC | Age: 41
End: 2021-11-19

## 2021-11-19 DIAGNOSIS — R26.9 GAIT DISTURBANCE: ICD-10-CM

## 2021-11-19 DIAGNOSIS — M25.562 LEFT KNEE PAIN, UNSPECIFIED CHRONICITY: ICD-10-CM

## 2021-11-19 DIAGNOSIS — Z98.890 S/P LEFT KNEE SURGERY: Primary | ICD-10-CM

## 2021-11-19 PROCEDURE — 97110 THERAPEUTIC EXERCISES: CPT | Performed by: PHYSICAL THERAPIST

## 2021-11-19 NOTE — PROGRESS NOTES
Physical Therapy Daily Treatment Note      Patient: Saul Morfin   : 1980  Referring practitioner: Luis Miguel Amezcua MD  Date of Initial Visit: Type: THERAPY  Noted: 9/15/2021  Today's Date: 2021  Patient seen for 11 sessions         Saul Morfin reports: knee is doing better and no complaints of pain today. He still has trouble with stairs.       Subjective     Objective   See Exercise, Manual, and Modality Logs for complete treatment.       Assessment & Plan     Assessment  Assessment details: Pt tolerated progression of LE strengthening and stability exercises well with improvement in L knee strength and motor control noted. Pt is also better able to tolerated activity with less complaints of pain. Pt educated on new HEP at the gym. Pt educated on to avoid any heavy lifting. Progress next visit as tolerated.         Visit Diagnoses:    ICD-10-CM ICD-9-CM   1. S/P left knee surgery  Z98.890 V45.89   2. Left knee pain, unspecified chronicity  M25.562 719.46   3. Gait disturbance  R26.9 781.2       Progress per Plan of Care           Timed:         Manual Therapy:    0     mins  81333;     Therapeutic Exercise:    30     mins  52516;     Neuromuscular Johnnie:    0    mins  22395;    Therapeutic Activity:     0     mins  91434;     Gait Trainin     mins  32205;     Ultrasound:     0     mins  99834;    Ionto                               00    mins   20502  Self-care  __0__ mins 18275    Un-Timed:  Electrical Stimulation:    0     mins  22242 ( );  Traction     0     mins 40752  Hot pack     0     Mins    Cold pack                       0     Mins      Timed Treatment:   30   mins   Total Treatment:     30   mins    Jazmin Rivera PT, DPT  Physical Therapist    NPI:[id number]  [place] License: [id number]

## 2021-12-09 ENCOUNTER — TELEPHONE (OUTPATIENT)
Dept: INTERNAL MEDICINE | Facility: CLINIC | Age: 41
End: 2021-12-09

## 2021-12-09 RX ORDER — ARIPIPRAZOLE 2 MG/1
2 TABLET ORAL DAILY
Qty: 30 TABLET | Refills: 0 | Status: SHIPPED | OUTPATIENT
Start: 2021-12-09 | End: 2022-03-29 | Stop reason: SDUPTHER

## 2021-12-28 DIAGNOSIS — F41.9 ANXIETY: ICD-10-CM

## 2021-12-28 DIAGNOSIS — F32.A DEPRESSION, UNSPECIFIED DEPRESSION TYPE: ICD-10-CM

## 2021-12-29 RX ORDER — VENLAFAXINE 75 MG/1
75 TABLET ORAL 2 TIMES DAILY
Qty: 60 TABLET | Refills: 1 | Status: SHIPPED | OUTPATIENT
Start: 2021-12-29 | End: 2022-03-22 | Stop reason: SDUPTHER

## 2021-12-29 RX ORDER — BUSPIRONE HYDROCHLORIDE 10 MG/1
10 TABLET ORAL 2 TIMES DAILY
Qty: 60 TABLET | Refills: 3 | OUTPATIENT
Start: 2021-12-29 | End: 2022-01-28

## 2021-12-29 RX ORDER — CHOLECALCIFEROL (VITAMIN D3) 125 MCG
2000 CAPSULE ORAL DAILY
Qty: 30 TABLET | Refills: 3 | OUTPATIENT
Start: 2021-12-29

## 2021-12-29 RX ORDER — ARIPIPRAZOLE 2 MG/1
2 TABLET ORAL DAILY
Qty: 30 TABLET | Refills: 0 | OUTPATIENT
Start: 2021-12-29

## 2021-12-29 RX ORDER — BUPROPION HYDROCHLORIDE 150 MG/1
150 TABLET, EXTENDED RELEASE ORAL 3 TIMES DAILY
Qty: 90 TABLET | Refills: 3 | OUTPATIENT
Start: 2021-12-29 | End: 2022-01-28

## 2021-12-29 RX ORDER — TRAZODONE HYDROCHLORIDE 150 MG/1
150 TABLET ORAL NIGHTLY
Qty: 90 TABLET | Refills: 1 | OUTPATIENT
Start: 2021-12-29

## 2022-03-22 DIAGNOSIS — F32.A DEPRESSION, UNSPECIFIED DEPRESSION TYPE: ICD-10-CM

## 2022-03-22 DIAGNOSIS — F41.9 ANXIETY: ICD-10-CM

## 2022-03-23 RX ORDER — BUPROPION HYDROCHLORIDE 150 MG/1
150 TABLET, EXTENDED RELEASE ORAL 3 TIMES DAILY
Qty: 90 TABLET | Refills: 3 | Status: SHIPPED | OUTPATIENT
Start: 2022-03-23 | End: 2022-07-01 | Stop reason: SDUPTHER

## 2022-03-23 RX ORDER — TRAZODONE HYDROCHLORIDE 150 MG/1
150 TABLET ORAL NIGHTLY
Qty: 30 TABLET | Refills: 0 | Status: SHIPPED | OUTPATIENT
Start: 2022-03-23 | End: 2022-07-01 | Stop reason: SDUPTHER

## 2022-03-23 RX ORDER — BUSPIRONE HYDROCHLORIDE 10 MG/1
10 TABLET ORAL 2 TIMES DAILY
Qty: 60 TABLET | Refills: 3 | Status: SHIPPED | OUTPATIENT
Start: 2022-03-23 | End: 2022-07-01 | Stop reason: SDUPTHER

## 2022-03-23 RX ORDER — CHOLECALCIFEROL (VITAMIN D3) 125 MCG
2000 CAPSULE ORAL DAILY
Qty: 30 TABLET | Refills: 3 | Status: SHIPPED | OUTPATIENT
Start: 2022-03-23 | End: 2022-07-01 | Stop reason: SDUPTHER

## 2022-03-23 RX ORDER — VENLAFAXINE 75 MG/1
75 TABLET ORAL 2 TIMES DAILY
Qty: 60 TABLET | Refills: 1 | Status: SHIPPED | OUTPATIENT
Start: 2022-03-23 | End: 2022-05-04 | Stop reason: SDUPTHER

## 2022-03-23 NOTE — TELEPHONE ENCOUNTER
Patient was given a 30-day supply of Abilify in November, please call and see if he would like to restart this medication.

## 2022-03-23 NOTE — TELEPHONE ENCOUNTER
Called patient. No answer; left VM for patient to give the office a call back on behalf of his medication that is being requested.

## 2022-03-29 RX ORDER — ARIPIPRAZOLE 2 MG/1
2 TABLET ORAL DAILY
Qty: 30 TABLET | Refills: 0 | Status: SHIPPED | OUTPATIENT
Start: 2022-03-29 | End: 2022-07-01 | Stop reason: SDUPTHER

## 2022-03-29 RX ORDER — ARIPIPRAZOLE 2 MG/1
2 TABLET ORAL DAILY
Qty: 30 TABLET | Refills: 0 | OUTPATIENT
Start: 2022-03-29

## 2022-03-29 NOTE — TELEPHONE ENCOUNTER
Prescription denied, unsure if patient is still taking this as it has not been filled. Please try to contact him again.

## 2022-05-04 DIAGNOSIS — F32.A DEPRESSION, UNSPECIFIED DEPRESSION TYPE: ICD-10-CM

## 2022-05-04 DIAGNOSIS — F41.9 ANXIETY: ICD-10-CM

## 2022-05-04 RX ORDER — TRAZODONE HYDROCHLORIDE 150 MG/1
150 TABLET ORAL NIGHTLY
Qty: 30 TABLET | Refills: 0 | OUTPATIENT
Start: 2022-05-04

## 2022-05-04 RX ORDER — BUPROPION HYDROCHLORIDE 150 MG/1
150 TABLET, EXTENDED RELEASE ORAL 3 TIMES DAILY
Qty: 90 TABLET | Refills: 3 | OUTPATIENT
Start: 2022-05-04 | End: 2022-06-03

## 2022-05-04 RX ORDER — ARIPIPRAZOLE 2 MG/1
2 TABLET ORAL DAILY
Qty: 30 TABLET | Refills: 0 | OUTPATIENT
Start: 2022-05-04

## 2022-05-04 RX ORDER — BUSPIRONE HYDROCHLORIDE 10 MG/1
10 TABLET ORAL 2 TIMES DAILY
Qty: 60 TABLET | Refills: 3 | OUTPATIENT
Start: 2022-05-04 | End: 2022-06-03

## 2022-05-04 RX ORDER — VENLAFAXINE 75 MG/1
75 TABLET ORAL 2 TIMES DAILY
Qty: 60 TABLET | Refills: 0 | Status: SHIPPED | OUTPATIENT
Start: 2022-05-04 | End: 2022-07-01 | Stop reason: SDUPTHER

## 2022-05-04 RX ORDER — CHOLECALCIFEROL (VITAMIN D3) 125 MCG
2000 CAPSULE ORAL DAILY
Qty: 30 TABLET | Refills: 3 | OUTPATIENT
Start: 2022-05-04

## 2022-05-06 NOTE — TELEPHONE ENCOUNTER
Are you okay to refill this RX. Has not been filled since Nov I believe. I sent in the Trazodone. I only sent a 30 day courtesy refill due to him not being seen in the last 3 months.   Stable

## 2022-07-01 DIAGNOSIS — F41.9 ANXIETY: ICD-10-CM

## 2022-07-01 DIAGNOSIS — F32.A DEPRESSION, UNSPECIFIED DEPRESSION TYPE: ICD-10-CM

## 2022-07-05 NOTE — TELEPHONE ENCOUNTER
Please advise.   Called patient to schedule a follow up since previous RX were denied because patient needs follow up.   Unable to reach patient or leave voicemail.

## 2022-07-06 RX ORDER — VENLAFAXINE 75 MG/1
75 TABLET ORAL 2 TIMES DAILY
Qty: 180 TABLET | Refills: 1 | Status: SHIPPED | OUTPATIENT
Start: 2022-07-06 | End: 2022-07-19 | Stop reason: SDUPTHER

## 2022-07-11 RX ORDER — BUPROPION HYDROCHLORIDE 150 MG/1
150 TABLET, EXTENDED RELEASE ORAL 2 TIMES DAILY
Qty: 60 TABLET | Refills: 0 | Status: SHIPPED | OUTPATIENT
Start: 2022-07-11 | End: 2022-07-19 | Stop reason: SDUPTHER

## 2022-07-11 RX ORDER — ARIPIPRAZOLE 2 MG/1
2 TABLET ORAL DAILY
Qty: 30 TABLET | Refills: 0 | Status: SHIPPED | OUTPATIENT
Start: 2022-07-11 | End: 2022-07-19 | Stop reason: SDUPTHER

## 2022-07-11 RX ORDER — BUSPIRONE HYDROCHLORIDE 10 MG/1
10 TABLET ORAL 2 TIMES DAILY
Qty: 60 TABLET | Refills: 0 | Status: SHIPPED | OUTPATIENT
Start: 2022-07-11 | End: 2022-07-19 | Stop reason: SDUPTHER

## 2022-07-11 RX ORDER — TRAZODONE HYDROCHLORIDE 150 MG/1
150 TABLET ORAL NIGHTLY
Qty: 30 TABLET | Refills: 0 | Status: SHIPPED | OUTPATIENT
Start: 2022-07-11 | End: 2022-07-19 | Stop reason: SDUPTHER

## 2022-07-11 RX ORDER — CHOLECALCIFEROL (VITAMIN D3) 125 MCG
2000 CAPSULE ORAL DAILY
Qty: 30 TABLET | Refills: 3 | Status: SHIPPED | OUTPATIENT
Start: 2022-07-11 | End: 2023-03-07 | Stop reason: SDUPTHER

## 2022-07-12 ENCOUNTER — DOCUMENTATION (OUTPATIENT)
Dept: PHYSICAL THERAPY | Facility: CLINIC | Age: 42
End: 2022-07-12

## 2022-07-12 NOTE — PROGRESS NOTES
Discharge Summary  Discharge Summary from Physical Therapy Report      Dates  PT visit: 9/15/21-11/19/21  Number of Visits: 11         Goals: Partially Met    Discharge Plan: Patient to return to referring/providing physician        Date of Discharge Pt last seen on 11/19/21        Jazmin Rivera PT  Physical Therapist

## 2022-07-19 ENCOUNTER — OFFICE VISIT (OUTPATIENT)
Dept: INTERNAL MEDICINE | Facility: CLINIC | Age: 42
End: 2022-07-19

## 2022-07-19 VITALS
WEIGHT: 287 LBS | RESPIRATION RATE: 18 BRPM | DIASTOLIC BLOOD PRESSURE: 78 MMHG | HEIGHT: 72 IN | TEMPERATURE: 97.2 F | SYSTOLIC BLOOD PRESSURE: 138 MMHG | HEART RATE: 87 BPM | BODY MASS INDEX: 38.87 KG/M2 | OXYGEN SATURATION: 97 %

## 2022-07-19 DIAGNOSIS — K21.9 GASTROESOPHAGEAL REFLUX DISEASE, UNSPECIFIED WHETHER ESOPHAGITIS PRESENT: ICD-10-CM

## 2022-07-19 DIAGNOSIS — F31.0 BIPOLAR AFFECTIVE DISORDER, CURRENT EPISODE HYPOMANIC: ICD-10-CM

## 2022-07-19 DIAGNOSIS — E55.9 VITAMIN D DEFICIENCY: ICD-10-CM

## 2022-07-19 DIAGNOSIS — Z00.00 ANNUAL PHYSICAL EXAM: Primary | ICD-10-CM

## 2022-07-19 DIAGNOSIS — G47.00 INSOMNIA, UNSPECIFIED TYPE: ICD-10-CM

## 2022-07-19 DIAGNOSIS — N52.9 ERECTILE DYSFUNCTION, UNSPECIFIED ERECTILE DYSFUNCTION TYPE: ICD-10-CM

## 2022-07-19 LAB
ALBUMIN SERPL-MCNC: 4.2 G/DL (ref 3.5–5.2)
ALBUMIN/GLOB SERPL: 1.2 G/DL
ALP SERPL-CCNC: 75 U/L (ref 39–117)
ALT SERPL W P-5'-P-CCNC: 30 U/L (ref 1–41)
ANION GAP SERPL CALCULATED.3IONS-SCNC: 10.4 MMOL/L (ref 5–15)
AST SERPL-CCNC: 19 U/L (ref 1–40)
BASOPHILS # BLD AUTO: 0.05 10*3/MM3 (ref 0–0.2)
BASOPHILS NFR BLD AUTO: 0.6 % (ref 0–1.5)
BILIRUB SERPL-MCNC: 0.3 MG/DL (ref 0–1.2)
BUN SERPL-MCNC: 17 MG/DL (ref 6–20)
BUN/CREAT SERPL: 11.2 (ref 7–25)
CALCIUM SPEC-SCNC: 9.7 MG/DL (ref 8.6–10.5)
CHLORIDE SERPL-SCNC: 104 MMOL/L (ref 98–107)
CHOLEST SERPL-MCNC: 169 MG/DL (ref 0–200)
CO2 SERPL-SCNC: 25.6 MMOL/L (ref 22–29)
CREAT SERPL-MCNC: 1.52 MG/DL (ref 0.76–1.27)
DEPRECATED RDW RBC AUTO: 40.1 FL (ref 37–54)
EGFRCR SERPLBLD CKD-EPI 2021: 57.6 ML/MIN/1.73
EOSINOPHIL # BLD AUTO: 0.15 10*3/MM3 (ref 0–0.4)
EOSINOPHIL NFR BLD AUTO: 1.8 % (ref 0.3–6.2)
ERYTHROCYTE [DISTWIDTH] IN BLOOD BY AUTOMATED COUNT: 12.9 % (ref 12.3–15.4)
GLOBULIN UR ELPH-MCNC: 3.5 GM/DL
GLUCOSE SERPL-MCNC: 87 MG/DL (ref 65–99)
HCT VFR BLD AUTO: 43.7 % (ref 37.5–51)
HDLC SERPL-MCNC: 34 MG/DL (ref 40–60)
HGB BLD-MCNC: 14.4 G/DL (ref 13–17.7)
IMM GRANULOCYTES # BLD AUTO: 0.03 10*3/MM3 (ref 0–0.05)
IMM GRANULOCYTES NFR BLD AUTO: 0.4 % (ref 0–0.5)
LDLC SERPL CALC-MCNC: 88 MG/DL (ref 0–100)
LDLC/HDLC SERPL: 2.29 {RATIO}
LYMPHOCYTES # BLD AUTO: 2.39 10*3/MM3 (ref 0.7–3.1)
LYMPHOCYTES NFR BLD AUTO: 29.3 % (ref 19.6–45.3)
MCH RBC QN AUTO: 28.3 PG (ref 26.6–33)
MCHC RBC AUTO-ENTMCNC: 33 G/DL (ref 31.5–35.7)
MCV RBC AUTO: 86 FL (ref 79–97)
MONOCYTES # BLD AUTO: 0.55 10*3/MM3 (ref 0.1–0.9)
MONOCYTES NFR BLD AUTO: 6.7 % (ref 5–12)
NEUTROPHILS NFR BLD AUTO: 4.99 10*3/MM3 (ref 1.7–7)
NEUTROPHILS NFR BLD AUTO: 61.2 % (ref 42.7–76)
NRBC BLD AUTO-RTO: 0 /100 WBC (ref 0–0.2)
PLATELET # BLD AUTO: 262 10*3/MM3 (ref 140–450)
PMV BLD AUTO: 10.4 FL (ref 6–12)
POTASSIUM SERPL-SCNC: 4.9 MMOL/L (ref 3.5–5.2)
PROT SERPL-MCNC: 7.7 G/DL (ref 6–8.5)
RBC # BLD AUTO: 5.08 10*6/MM3 (ref 4.14–5.8)
SODIUM SERPL-SCNC: 140 MMOL/L (ref 136–145)
TRIGL SERPL-MCNC: 285 MG/DL (ref 0–150)
VLDLC SERPL-MCNC: 47 MG/DL (ref 5–40)
WBC NRBC COR # BLD: 8.16 10*3/MM3 (ref 3.4–10.8)

## 2022-07-19 PROCEDURE — 80053 COMPREHEN METABOLIC PANEL: CPT | Performed by: NURSE PRACTITIONER

## 2022-07-19 PROCEDURE — 36415 COLL VENOUS BLD VENIPUNCTURE: CPT | Performed by: NURSE PRACTITIONER

## 2022-07-19 PROCEDURE — 85025 COMPLETE CBC W/AUTO DIFF WBC: CPT | Performed by: NURSE PRACTITIONER

## 2022-07-19 PROCEDURE — 80061 LIPID PANEL: CPT | Performed by: NURSE PRACTITIONER

## 2022-07-19 PROCEDURE — 84443 ASSAY THYROID STIM HORMONE: CPT | Performed by: NURSE PRACTITIONER

## 2022-07-19 PROCEDURE — 99396 PREV VISIT EST AGE 40-64: CPT | Performed by: NURSE PRACTITIONER

## 2022-07-19 PROCEDURE — 82306 VITAMIN D 25 HYDROXY: CPT | Performed by: NURSE PRACTITIONER

## 2022-07-19 RX ORDER — SILDENAFIL 100 MG/1
100 TABLET, FILM COATED ORAL AS NEEDED
Qty: 15 TABLET | Refills: 1 | Status: SHIPPED | OUTPATIENT
Start: 2022-07-19 | End: 2023-03-07 | Stop reason: SDUPTHER

## 2022-07-19 RX ORDER — VENLAFAXINE 75 MG/1
75 TABLET ORAL 2 TIMES DAILY
Qty: 180 TABLET | Refills: 1 | Status: SHIPPED | OUTPATIENT
Start: 2022-07-19 | End: 2022-08-01

## 2022-07-19 RX ORDER — BUPROPION HYDROCHLORIDE 150 MG/1
150 TABLET, EXTENDED RELEASE ORAL 2 TIMES DAILY
Qty: 180 TABLET | Refills: 1 | Status: SHIPPED | OUTPATIENT
Start: 2022-07-19 | End: 2022-10-20

## 2022-07-19 RX ORDER — OMEPRAZOLE 20 MG/1
20 CAPSULE, DELAYED RELEASE ORAL DAILY
COMMUNITY
Start: 2022-05-25 | End: 2022-07-19 | Stop reason: SDUPTHER

## 2022-07-19 RX ORDER — BUSPIRONE HYDROCHLORIDE 10 MG/1
10 TABLET ORAL 2 TIMES DAILY
Qty: 180 TABLET | Refills: 1 | Status: SHIPPED | OUTPATIENT
Start: 2022-07-19 | End: 2023-01-17

## 2022-07-19 RX ORDER — LORATADINE 10 MG/1
10 TABLET ORAL DAILY
COMMUNITY
Start: 2022-05-25 | End: 2022-07-19 | Stop reason: SDUPTHER

## 2022-07-19 RX ORDER — OMEPRAZOLE 20 MG/1
20 CAPSULE, DELAYED RELEASE ORAL DAILY
Qty: 90 CAPSULE | Refills: 1 | Status: SHIPPED | OUTPATIENT
Start: 2022-07-19 | End: 2023-03-07 | Stop reason: SDUPTHER

## 2022-07-19 RX ORDER — ARIPIPRAZOLE 2 MG/1
2 TABLET ORAL DAILY
Qty: 90 TABLET | Refills: 1 | Status: SHIPPED | OUTPATIENT
Start: 2022-07-19 | End: 2023-01-16

## 2022-07-19 RX ORDER — TRAZODONE HYDROCHLORIDE 150 MG/1
150 TABLET ORAL NIGHTLY
Qty: 90 TABLET | Refills: 1 | Status: SHIPPED | OUTPATIENT
Start: 2022-07-19 | End: 2023-01-16

## 2022-07-19 RX ORDER — LORATADINE 10 MG/1
10 TABLET ORAL DAILY
Qty: 90 TABLET | Refills: 1 | Status: SHIPPED | OUTPATIENT
Start: 2022-07-19 | End: 2023-03-07 | Stop reason: SDUPTHER

## 2022-07-19 NOTE — ASSESSMENT & PLAN NOTE
Basic labs in clinic today. Discussed age appropriate immunizations, screenings. Age appropriate handout provided.

## 2022-07-19 NOTE — PROGRESS NOTES
"Chief Complaint  Follow-up, Depression, and Anxiety    Subjective        Saul Morfin presents to [facility]  Patient in clinic for annual exam and medication refill.     Bipolar-  Patient states he can tell that his mood is worsening since being out of medication for a few days. Previously well controlled with Abilify, Wellbutrin, Buspar, Effexor and trazodone. Patient is currently not working and admits that he can tell a difference in his mental health being idle. He does not currently have any hobbies or many interests to keep him busy. He would be agreeable to speaking with a therapist.    Vitamin D deficiency-  Managed with oral supplement, due for repeat labs.    Erectile dysfunction-  Tolerates Viagra without any issue.    GERD-  Well controlled with omeprazole.     Colonoscopy: Denies family history of colon cancer  PSA: Denies family history of prostate cancer  COVID19 vaccination: Up to date      Objective   Vital Signs:  /78 (BP Location: Right arm, Patient Position: Sitting, Cuff Size: Adult)   Pulse 87   Temp 97.2 °F (36.2 °C)   Resp 18   Ht 182.9 cm (72\")   Wt 130 kg (287 lb)   SpO2 97%   BMI 38.92 kg/m²   Estimated body mass index is 38.92 kg/m² as calculated from the following:    Height as of this encounter: 182.9 cm (72\").    Weight as of this encounter: 130 kg (287 lb).          Physical Exam  Constitutional:       Appearance: Normal appearance.   HENT:      Head: Normocephalic and atraumatic.      Nose: Nose normal.      Mouth/Throat:      Mouth: Mucous membranes are moist.      Pharynx: Oropharynx is clear.   Eyes:      Extraocular Movements: Extraocular movements intact.      Conjunctiva/sclera: Conjunctivae normal.      Pupils: Pupils are equal, round, and reactive to light.   Cardiovascular:      Rate and Rhythm: Normal rate and regular rhythm.      Heart sounds: Normal heart sounds.   Pulmonary:      Effort: Pulmonary effort " is normal.      Breath sounds: Normal breath sounds.   Skin:     General: Skin is warm and dry.   Neurological:      General: No focal deficit present.      Mental Status: He is alert and oriented to person, place, and time.   Psychiatric:         Mood and Affect: Mood normal.         Behavior: Behavior normal.         Thought Content: Thought content normal.        Result Review :                Assessment and Plan   Diagnoses and all orders for this visit:    1. Annual physical exam (Primary)  Assessment & Plan:  Basic labs in clinic today. Discussed age appropriate immunizations, screenings. Age appropriate handout provided.      Orders:  -     Comprehensive Metabolic Panel  -     CBC & Differential  -     TSH  -     Lipid Panel  -     Vitamin D 25 Hydroxy    2. Vitamin D deficiency  Assessment & Plan:  Continue vitamin D supplement, vitamin D level with labs today.       Orders:  -     Vitamin D 25 Hydroxy    3. Insomnia, unspecified type  Assessment & Plan:  Trazodone refilled today.      4. Bipolar affective disorder, current episode hypomanic (HCC)  Assessment & Plan:  Continue Abilify, Wellbutrin, trazodone, Effexor and Buspar. He will seek medical attention immediately if he feels that his mental health is deteriorating. Denies SI/HI. Will continue to monitor. Local therapy list provided to patient and strongly encouraged making an appointment.        Orders:  -     buPROPion SR (Wellbutrin SR) 150 MG 12 hr tablet; Take 1 tablet by mouth 2 (Two) Times a Day for 90 days.  Dispense: 180 tablet; Refill: 1  -     busPIRone (BUSPAR) 10 MG tablet; Take 1 tablet by mouth 2 (Two) Times a Day for 90 days.  Dispense: 180 tablet; Refill: 1  -     venlafaxine (EFFEXOR) 75 MG tablet; Take 1 tablet by mouth 2 (Two) Times a Day. Please make an appointment for additional refills.  Dispense: 180 tablet; Refill: 1    5. Gastroesophageal reflux disease, unspecified whether esophagitis present  Assessment & Plan:  Well  controlled, continue omeprazole.      6. Erectile dysfunction, unspecified erectile dysfunction type  Assessment & Plan:  Continue sildenafil.       Other orders  -     ARIPiprazole (Abilify) 2 MG tablet; Take 1 tablet by mouth Daily.  Dispense: 90 tablet; Refill: 1  -     loratadine (CLARITIN) 10 MG tablet; Take 1 tablet by mouth Daily.  Dispense: 90 tablet; Refill: 1  -     omeprazole (priLOSEC) 20 MG capsule; Take 1 capsule by mouth Daily.  Dispense: 90 capsule; Refill: 1  -     traZODone (DESYREL) 150 MG tablet; Take 1 tablet by mouth Every Night.  Dispense: 90 tablet; Refill: 1  -     sildenafil (VIAGRA) 100 MG tablet; Take 1 tablet by mouth As Needed for Erectile Dysfunction.  Dispense: 15 tablet; Refill: 1           Follow Up   Return in about 6 months (around 1/19/2023).  Patient was given instructions and counseling regarding his condition or for health maintenance advice. Please see specific information pulled into the AVS if appropriate.

## 2022-07-19 NOTE — ASSESSMENT & PLAN NOTE
Continue Abilify, Wellbutrin, trazodone, Effexor and Buspar. He will seek medical attention immediately if he feels that his mental health is deteriorating. Denies SI/HI. Will continue to monitor. Local therapy list provided to patient and strongly encouraged making an appointment.

## 2022-07-20 LAB
25(OH)D3 SERPL-MCNC: 47.6 NG/ML (ref 30–100)
TSH SERPL DL<=0.05 MIU/L-ACNC: 1.54 UIU/ML (ref 0.27–4.2)

## 2022-07-30 DIAGNOSIS — F31.0 BIPOLAR AFFECTIVE DISORDER, CURRENT EPISODE HYPOMANIC: ICD-10-CM

## 2022-08-01 RX ORDER — VENLAFAXINE 75 MG/1
TABLET ORAL
Qty: 180 TABLET | Refills: 1 | Status: SHIPPED | OUTPATIENT
Start: 2022-08-01 | End: 2023-03-06

## 2022-10-20 DIAGNOSIS — F31.0 BIPOLAR AFFECTIVE DISORDER, CURRENT EPISODE HYPOMANIC: ICD-10-CM

## 2022-10-20 RX ORDER — BUPROPION HYDROCHLORIDE 150 MG/1
TABLET, EXTENDED RELEASE ORAL
Qty: 180 TABLET | Refills: 1 | Status: SHIPPED | OUTPATIENT
Start: 2022-10-20 | End: 2023-03-07 | Stop reason: SDUPTHER

## 2022-11-15 ENCOUNTER — OFFICE VISIT (OUTPATIENT)
Dept: INTERNAL MEDICINE | Facility: CLINIC | Age: 42
End: 2022-11-15

## 2022-11-15 VITALS
BODY MASS INDEX: 38.79 KG/M2 | DIASTOLIC BLOOD PRESSURE: 76 MMHG | SYSTOLIC BLOOD PRESSURE: 130 MMHG | OXYGEN SATURATION: 96 % | HEART RATE: 79 BPM | TEMPERATURE: 99.1 F | WEIGHT: 286 LBS

## 2022-11-15 DIAGNOSIS — J10.1 INFLUENZA A: ICD-10-CM

## 2022-11-15 DIAGNOSIS — R05.1 ACUTE COUGH: Primary | ICD-10-CM

## 2022-11-15 LAB
EXPIRATION DATE: ABNORMAL
FLUAV AG UPPER RESP QL IA.RAPID: DETECTED
FLUBV AG UPPER RESP QL IA.RAPID: NOT DETECTED
INTERNAL CONTROL: ABNORMAL
Lab: ABNORMAL
SARS-COV-2 AG UPPER RESP QL IA.RAPID: NOT DETECTED

## 2022-11-15 PROCEDURE — 87428 SARSCOV & INF VIR A&B AG IA: CPT | Performed by: PHYSICIAN ASSISTANT

## 2022-11-15 PROCEDURE — 99213 OFFICE O/P EST LOW 20 MIN: CPT | Performed by: PHYSICIAN ASSISTANT

## 2022-11-15 RX ORDER — OSELTAMIVIR PHOSPHATE 75 MG/1
75 CAPSULE ORAL 2 TIMES DAILY
Qty: 10 CAPSULE | Refills: 0 | Status: SHIPPED | OUTPATIENT
Start: 2022-11-15 | End: 2022-11-20

## 2022-11-15 RX ORDER — BROMPHENIRAMINE MALEATE, PSEUDOEPHEDRINE HYDROCHLORIDE, AND DEXTROMETHORPHAN HYDROBROMIDE 2; 30; 10 MG/5ML; MG/5ML; MG/5ML
10 SYRUP ORAL 4 TIMES DAILY PRN
Qty: 473 ML | Refills: 0 | Status: SHIPPED | OUTPATIENT
Start: 2022-11-15 | End: 2023-03-08

## 2022-11-15 RX ORDER — GUAIFENESIN, PSEUDOEPHEDRINE HYDROCHLORIDE 600; 60 MG/1; MG/1
1 TABLET, EXTENDED RELEASE ORAL EVERY 12 HOURS
COMMUNITY

## 2022-11-15 NOTE — PROGRESS NOTES
Chief Complaint  Cough, Headache, Fever, Sore Throat (Burns down throat into chest), and Diarrhea (Bodyaches)    Subjective          Saul Morfin presents to [facility]  History of Present Illness  Cough, congestion, headache, body aches, low grade fever.  He has had associated diarrhea.  Symptoms started two days ago.  He has taken some mucinex dm and zicam.  Patient's spouse has similar symptoms as well.  Patient has been around sick person last week who tested positive for the flu.        Objective   Vital Signs:   /76 (BP Location: Left arm)   Pulse 79   Temp 99.1 °F (37.3 °C) (Tympanic)   Wt 130 kg (286 lb)   SpO2 96%   BMI 38.79 kg/m²     Physical Exam  Vitals reviewed.   Constitutional:       Appearance: Normal appearance. He is well-developed.   HENT:      Head: Normocephalic and atraumatic.      Right Ear: Tympanic membrane, ear canal and external ear normal.      Left Ear: Tympanic membrane, ear canal and external ear normal.      Mouth/Throat:      Mouth: Mucous membranes are moist.      Pharynx: Posterior oropharyngeal erythema present.   Eyes:      Conjunctiva/sclera: Conjunctivae normal.      Pupils: Pupils are equal, round, and reactive to light.   Cardiovascular:      Rate and Rhythm: Normal rate and regular rhythm.      Heart sounds: No murmur heard.    No friction rub. No gallop.   Pulmonary:      Effort: Pulmonary effort is normal.      Breath sounds: Normal breath sounds. No wheezing or rhonchi.   Musculoskeletal:      Cervical back: No tenderness.   Lymphadenopathy:      Cervical: No cervical adenopathy.   Skin:     General: Skin is warm and dry.   Neurological:      Mental Status: He is alert and oriented to person, place, and time.      Cranial Nerves: No cranial nerve deficit.   Psychiatric:         Mood and Affect: Mood and affect normal.         Behavior: Behavior normal.         Thought Content: Thought content normal.          Judgment: Judgment normal.        Result Review :          Procedures      Assessment and Plan {CC Problem List  Visit Diagnosis   ROS  Review (Popup)  Health Maintenance  Quality  BestPractice  Medications  SmartSets  SnapShot Encounters  Media :23}   Diagnoses and all orders for this visit:    1. Acute cough (Primary)  -     POCT SARS-CoV-2 Antigen LENNOX  -     brompheniramine-pseudoephedrine-DM 30-2-10 MG/5ML syrup; Take 10 mL by mouth 4 (Four) Times a Day As Needed for Cough.  Dispense: 473 mL; Refill: 0    2. Influenza A  Assessment & Plan:  + Flu A in office. Would expect symptoms to be self limiting within the next few days.  Continue conservative treatment at this time, I will send in Tamflu and bromfed to help with cough.  Watch closely for new or worsening symptoms, especially if patient develops fevers, difficulty breathing or signs of dehydration.  Call or return if symptoms persist or worsen.       Orders:  -     oseltamivir (Tamiflu) 75 MG capsule; Take 1 capsule by mouth 2 (Two) Times a Day for 5 days.  Dispense: 10 capsule; Refill: 0            Follow Up   Return if symptoms worsen or fail to improve.  Patient was given instructions and counseling regarding his condition or for health maintenance advice. Please see specific information pulled into the AVS if appropriate.

## 2022-11-15 NOTE — ASSESSMENT & PLAN NOTE
+ Flu A in office. Would expect symptoms to be self limiting within the next few days.  Continue conservative treatment at this time, I will send in Tamflu and bromfed to help with cough.  Watch closely for new or worsening symptoms, especially if patient develops fevers, difficulty breathing or signs of dehydration.  Call or return if symptoms persist or worsen.

## 2023-01-16 RX ORDER — TRAZODONE HYDROCHLORIDE 150 MG/1
150 TABLET ORAL NIGHTLY
Qty: 90 TABLET | Refills: 0 | Status: SHIPPED | OUTPATIENT
Start: 2023-01-16 | End: 2023-03-07 | Stop reason: SDUPTHER

## 2023-01-16 RX ORDER — ARIPIPRAZOLE 2 MG/1
2 TABLET ORAL DAILY
Qty: 90 TABLET | Refills: 0 | Status: SHIPPED | OUTPATIENT
Start: 2023-01-16 | End: 2023-03-07 | Stop reason: SDUPTHER

## 2023-01-17 DIAGNOSIS — F31.0 BIPOLAR AFFECTIVE DISORDER, CURRENT EPISODE HYPOMANIC: ICD-10-CM

## 2023-01-17 RX ORDER — BUSPIRONE HYDROCHLORIDE 10 MG/1
TABLET ORAL
Qty: 180 TABLET | Refills: 1 | Status: SHIPPED | OUTPATIENT
Start: 2023-01-17

## 2023-03-05 DIAGNOSIS — F31.0 BIPOLAR AFFECTIVE DISORDER, CURRENT EPISODE HYPOMANIC: ICD-10-CM

## 2023-03-06 RX ORDER — VENLAFAXINE 75 MG/1
TABLET ORAL
Qty: 180 TABLET | Refills: 1 | Status: SHIPPED | OUTPATIENT
Start: 2023-03-06

## 2023-03-07 DIAGNOSIS — F31.0 BIPOLAR AFFECTIVE DISORDER, CURRENT EPISODE HYPOMANIC: ICD-10-CM

## 2023-03-07 DIAGNOSIS — F41.9 ANXIETY: ICD-10-CM

## 2023-03-07 DIAGNOSIS — F32.A DEPRESSION, UNSPECIFIED DEPRESSION TYPE: ICD-10-CM

## 2023-03-07 RX ORDER — VENLAFAXINE 75 MG/1
TABLET ORAL
Qty: 180 TABLET | Refills: 1 | Status: CANCELLED | OUTPATIENT
Start: 2023-03-07

## 2023-03-08 RX ORDER — BUPROPION HYDROCHLORIDE 150 MG/1
150 TABLET, EXTENDED RELEASE ORAL 2 TIMES DAILY
Qty: 180 TABLET | Refills: 1 | Status: SHIPPED | OUTPATIENT
Start: 2023-03-08

## 2023-03-08 RX ORDER — LORATADINE 10 MG/1
10 TABLET ORAL DAILY
Qty: 90 TABLET | Refills: 1 | Status: SHIPPED | OUTPATIENT
Start: 2023-03-08

## 2023-03-08 RX ORDER — OMEPRAZOLE 20 MG/1
20 CAPSULE, DELAYED RELEASE ORAL DAILY
Qty: 90 CAPSULE | Refills: 1 | Status: SHIPPED | OUTPATIENT
Start: 2023-03-08

## 2023-03-09 RX ORDER — ARIPIPRAZOLE 2 MG/1
2 TABLET ORAL DAILY
Qty: 90 TABLET | Refills: 0 | Status: SHIPPED | OUTPATIENT
Start: 2023-03-09

## 2023-03-09 RX ORDER — TRAZODONE HYDROCHLORIDE 150 MG/1
150 TABLET ORAL NIGHTLY
Qty: 90 TABLET | Refills: 0 | Status: SHIPPED | OUTPATIENT
Start: 2023-03-09

## 2023-03-09 RX ORDER — CHOLECALCIFEROL (VITAMIN D3) 125 MCG
2000 CAPSULE ORAL DAILY
Qty: 30 TABLET | Refills: 3 | Status: SHIPPED | OUTPATIENT
Start: 2023-03-09

## 2023-03-09 RX ORDER — SILDENAFIL 100 MG/1
100 TABLET, FILM COATED ORAL AS NEEDED
Qty: 15 TABLET | Refills: 1 | Status: SHIPPED | OUTPATIENT
Start: 2023-03-09

## 2023-04-17 RX ORDER — ARIPIPRAZOLE 2 MG/1
2 TABLET ORAL DAILY
Qty: 90 TABLET | Refills: 0 | Status: SHIPPED | OUTPATIENT
Start: 2023-04-17

## 2023-04-17 RX ORDER — TRAZODONE HYDROCHLORIDE 150 MG/1
150 TABLET ORAL NIGHTLY
Qty: 90 TABLET | Refills: 0 | Status: SHIPPED | OUTPATIENT
Start: 2023-04-17

## 2023-06-06 DIAGNOSIS — F31.0 BIPOLAR AFFECTIVE DISORDER, CURRENT EPISODE HYPOMANIC: ICD-10-CM

## 2023-06-06 RX ORDER — BUSPIRONE HYDROCHLORIDE 10 MG/1
10 TABLET ORAL 2 TIMES DAILY
Qty: 180 TABLET | Refills: 1 | Status: SHIPPED | OUTPATIENT
Start: 2023-06-06

## 2023-06-27 ENCOUNTER — TELEPHONE (OUTPATIENT)
Dept: INTERNAL MEDICINE | Facility: CLINIC | Age: 43
End: 2023-06-27

## 2023-06-27 NOTE — TELEPHONE ENCOUNTER
Caller: CAPRI LEE    Relationship: Emergency Contact    Best call back number: [phone]     Requested Prescriptions:   ALL ACTIVE MEDICATION       Pharmacy where request should be sent: [id number] - [place], [place] - [address] AT [facility] - [phone]  - [phone] FX     Last office visit with prescribing clinician: 7/19/2022   Last telemedicine visit with prescribing clinician: Visit date not found   Next office visit with prescribing clinician: Visit date not found     Additional details provided by patient: PATIENT LEFT ON A TRIP AND NEEDS A WEEK SUPPLY AS HE FORGOT ALL OF HIS MEDICATIONS AT HOME    Does the patient have less than a 3 day supply:  [x] Yes  [] No    Would you like a call back once the refill request has been completed: [] Yes [x] No    If the office needs to give you a call back, can they leave a voicemail: [] Yes [x] No    Bharat Morales Rep   06/27/23 11:19 EDT

## 2023-08-14 DIAGNOSIS — F31.0 BIPOLAR AFFECTIVE DISORDER, CURRENT EPISODE HYPOMANIC: ICD-10-CM

## 2023-08-14 RX ORDER — BUSPIRONE HYDROCHLORIDE 10 MG/1
10 TABLET ORAL 2 TIMES DAILY
Qty: 60 TABLET | Refills: 0 | Status: SHIPPED | OUTPATIENT
Start: 2023-08-14

## 2023-08-21 DIAGNOSIS — F31.0 BIPOLAR AFFECTIVE DISORDER, CURRENT EPISODE HYPOMANIC: ICD-10-CM

## 2023-08-22 RX ORDER — BUPROPION HYDROCHLORIDE 150 MG/1
TABLET, EXTENDED RELEASE ORAL
Qty: 60 TABLET | Refills: 0 | Status: SHIPPED | OUTPATIENT
Start: 2023-08-22

## 2023-09-01 DIAGNOSIS — F31.0 BIPOLAR AFFECTIVE DISORDER, CURRENT EPISODE HYPOMANIC: ICD-10-CM

## 2023-09-01 RX ORDER — VENLAFAXINE 75 MG/1
TABLET ORAL
Qty: 180 TABLET | Refills: 0 | Status: SHIPPED | OUTPATIENT
Start: 2023-09-01

## 2023-09-01 RX ORDER — OMEPRAZOLE 20 MG/1
20 CAPSULE, DELAYED RELEASE ORAL DAILY
Qty: 30 CAPSULE | Refills: 0 | Status: SHIPPED | OUTPATIENT
Start: 2023-09-01

## 2023-09-06 RX ORDER — OMEPRAZOLE 20 MG/1
20 CAPSULE, DELAYED RELEASE ORAL DAILY
Qty: 90 CAPSULE | OUTPATIENT
Start: 2023-09-06

## 2023-09-15 ENCOUNTER — TELEPHONE (OUTPATIENT)
Dept: INTERNAL MEDICINE | Facility: CLINIC | Age: 43
End: 2023-09-15

## 2023-09-15 ENCOUNTER — OFFICE VISIT (OUTPATIENT)
Dept: INTERNAL MEDICINE | Facility: CLINIC | Age: 43
End: 2023-09-15
Payer: OTHER GOVERNMENT

## 2023-09-15 VITALS
DIASTOLIC BLOOD PRESSURE: 74 MMHG | HEART RATE: 77 BPM | TEMPERATURE: 98 F | SYSTOLIC BLOOD PRESSURE: 122 MMHG | WEIGHT: 293 LBS | HEIGHT: 72 IN | BODY MASS INDEX: 39.68 KG/M2 | OXYGEN SATURATION: 97 %

## 2023-09-15 DIAGNOSIS — Z00.00 ANNUAL PHYSICAL EXAM: Primary | ICD-10-CM

## 2023-09-15 DIAGNOSIS — K21.9 GASTROESOPHAGEAL REFLUX DISEASE, UNSPECIFIED WHETHER ESOPHAGITIS PRESENT: ICD-10-CM

## 2023-09-15 DIAGNOSIS — E55.9 VITAMIN D DEFICIENCY: ICD-10-CM

## 2023-09-15 DIAGNOSIS — D64.9 ANEMIA, UNSPECIFIED TYPE: ICD-10-CM

## 2023-09-15 DIAGNOSIS — F31.0 BIPOLAR AFFECTIVE DISORDER, CURRENT EPISODE HYPOMANIC: ICD-10-CM

## 2023-09-15 DIAGNOSIS — J30.9 ALLERGIC RHINITIS, UNSPECIFIED SEASONALITY, UNSPECIFIED TRIGGER: ICD-10-CM

## 2023-09-15 DIAGNOSIS — G47.00 INSOMNIA, UNSPECIFIED TYPE: ICD-10-CM

## 2023-09-15 DIAGNOSIS — N52.9 ERECTILE DYSFUNCTION, UNSPECIFIED ERECTILE DYSFUNCTION TYPE: ICD-10-CM

## 2023-09-15 PROCEDURE — 99396 PREV VISIT EST AGE 40-64: CPT | Performed by: NURSE PRACTITIONER

## 2023-09-15 RX ORDER — TRAZODONE HYDROCHLORIDE 150 MG/1
150 TABLET ORAL NIGHTLY
Qty: 90 TABLET | Refills: 1 | Status: CANCELLED | OUTPATIENT
Start: 2023-09-15

## 2023-09-15 RX ORDER — TRAZODONE HYDROCHLORIDE 150 MG/1
150 TABLET ORAL NIGHTLY
Qty: 90 TABLET | Refills: 1 | Status: SHIPPED | OUTPATIENT
Start: 2023-09-15

## 2023-09-15 RX ORDER — CHOLECALCIFEROL (VITAMIN D3) 125 MCG
2000 CAPSULE ORAL DAILY
Qty: 30 TABLET | Refills: 0 | Status: CANCELLED | OUTPATIENT
Start: 2023-09-15

## 2023-09-15 RX ORDER — CARBOXYMETHYLCELLULOSE SODIUM 5 MG/ML
SOLUTION/ DROPS OPHTHALMIC
COMMUNITY
Start: 2023-08-19

## 2023-09-15 RX ORDER — SILDENAFIL 100 MG/1
100 TABLET, FILM COATED ORAL AS NEEDED
Qty: 15 TABLET | Refills: 1 | Status: CANCELLED | OUTPATIENT
Start: 2023-09-15

## 2023-09-15 RX ORDER — SILDENAFIL 100 MG/1
100 TABLET, FILM COATED ORAL AS NEEDED
Qty: 15 TABLET | Refills: 1 | Status: SHIPPED | OUTPATIENT
Start: 2023-09-15

## 2023-09-15 RX ORDER — LORATADINE 10 MG/1
10 TABLET ORAL DAILY
Qty: 90 TABLET | Refills: 1 | Status: SHIPPED | OUTPATIENT
Start: 2023-09-15

## 2023-09-15 RX ORDER — BUSPIRONE HYDROCHLORIDE 10 MG/1
10 TABLET ORAL 2 TIMES DAILY
Qty: 180 TABLET | Refills: 1 | Status: SHIPPED | OUTPATIENT
Start: 2023-09-15

## 2023-09-15 RX ORDER — BUSPIRONE HYDROCHLORIDE 10 MG/1
10 TABLET ORAL 2 TIMES DAILY
Qty: 60 TABLET | Refills: 0 | Status: CANCELLED | OUTPATIENT
Start: 2023-09-15

## 2023-09-15 RX ORDER — VENLAFAXINE 75 MG/1
75 TABLET ORAL 2 TIMES DAILY
Qty: 180 TABLET | Refills: 1 | Status: SHIPPED | OUTPATIENT
Start: 2023-09-15

## 2023-09-15 RX ORDER — ARIPIPRAZOLE 2 MG/1
2 TABLET ORAL DAILY
Qty: 90 TABLET | Refills: 1 | Status: SHIPPED | OUTPATIENT
Start: 2023-09-15

## 2023-09-15 RX ORDER — CARBOXYMETHYLCELLULOSE SODIUM 5 MG/ML
SOLUTION/ DROPS OPHTHALMIC
Status: CANCELLED | OUTPATIENT
Start: 2023-09-15

## 2023-09-15 RX ORDER — BUPROPION HYDROCHLORIDE 150 MG/1
150 TABLET, EXTENDED RELEASE ORAL 2 TIMES DAILY
Qty: 180 TABLET | Refills: 1 | Status: SHIPPED | OUTPATIENT
Start: 2023-09-15 | End: 2023-12-14

## 2023-09-15 RX ORDER — VENLAFAXINE 75 MG/1
TABLET ORAL
Qty: 180 TABLET | Refills: 0 | Status: CANCELLED | OUTPATIENT
Start: 2023-09-15

## 2023-09-15 RX ORDER — ARIPIPRAZOLE 2 MG/1
2 TABLET ORAL DAILY
Qty: 90 TABLET | Refills: 1 | Status: CANCELLED | OUTPATIENT
Start: 2023-09-15

## 2023-09-15 RX ORDER — OMEPRAZOLE 20 MG/1
20 CAPSULE, DELAYED RELEASE ORAL DAILY
Qty: 30 CAPSULE | Refills: 0 | Status: CANCELLED | OUTPATIENT
Start: 2023-09-15

## 2023-09-15 RX ORDER — BUPROPION HYDROCHLORIDE 150 MG/1
150 TABLET, EXTENDED RELEASE ORAL 2 TIMES DAILY
Qty: 60 TABLET | Refills: 0 | Status: CANCELLED | OUTPATIENT
Start: 2023-09-15

## 2023-09-15 RX ORDER — OMEPRAZOLE 20 MG/1
20 CAPSULE, DELAYED RELEASE ORAL DAILY
Qty: 90 CAPSULE | Refills: 1 | Status: SHIPPED | OUTPATIENT
Start: 2023-09-15

## 2023-09-15 NOTE — ASSESSMENT & PLAN NOTE
Continue Abilify, Wellbutrin, Buspar, Effexor and trazodone. Discussed adjusting dosages, patient defers for now. He will continue to monitor and seek medical attention immediately if he feels that his mental health is deteriorating. Denies SI/HI. Will continue to monitor.

## 2023-09-15 NOTE — PROGRESS NOTES
"Chief Complaint  Med Refill (/) and Annual Exam    Subjective         Saul Morfin presents to [facility]  HPI     Bipolar-  Managed with Abilify, Wellbutrin, Buspar, Effexor and trazodone. States he thinks he is doing okay. Admits he could be better but is not interested in medication adjustments at this time. 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 He is no longer riding motorcycles, lost a friend to a motorcycle accident.      Vitamin D deficiency-  Has been out of supplement for a while, [facility]      Erectile dysfunction-  Has not been taking medication consistently, not sure if it works or not.    GERD-  Well controlled with omeprazole.     Allergic rhinitis-  Well controlled with Claritin.     Patient states anemia was [facility], he was scheduled for EGD/colonoscopy for evaluation.      Colonoscopy: Scheduled; Denies family history of colon cancer  PSA: Denies family history of prostate cancer  COVID19 vaccination: Up to date       Objective     Vitals:    09/15/23 1059   BP: 122/74   BP Location: Left arm   Patient Position: Sitting   Cuff Size: Adult   Pulse: 77   Temp: 98 °F (36.7 °C)   TempSrc: Temporal   SpO2: 97%   Weight: 133 kg (293 lb)   Height: 182.9 cm (72.01\")      Body mass index is 39.73 kg/m².    Wt Readings from Last 3 Encounters:   09/15/23 133 kg (293 lb)   11/15/22 130 kg (286 lb)   07/19/22 130 kg (287 lb)     BP Readings from Last 3 Encounters:   09/15/23 122/74   11/15/22 130/76   07/19/22 138/78       Class 2 Severe Obesity (BMI >=35 and <=39.9). Obesity-related health conditions include the following: GERD. Obesity is worsening. BMI is is above average; BMI management plan is completed.     Physical Exam  Constitutional:       Appearance: Normal appearance.   HENT:      Head: Normocephalic and atraumatic.      Nose: Nose normal.      " Mouth/Throat:      Mouth: Mucous membranes are moist.      Pharynx: Oropharynx is clear.   Eyes:      Extraocular Movements: Extraocular movements intact.      Conjunctiva/sclera: Conjunctivae normal.      Pupils: Pupils are equal, round, and reactive to light.   Neck:      Thyroid: No thyroid mass, thyromegaly or thyroid tenderness.   Cardiovascular:      Rate and Rhythm: Normal rate and regular rhythm.      Heart sounds: Normal heart sounds.   Pulmonary:      Effort: Pulmonary effort is normal.      Breath sounds: Normal breath sounds.   Skin:     General: Skin is warm and dry.   Neurological:      General: No focal deficit present.      Mental Status: He is alert and oriented to person, place, and time.   Psychiatric:         Mood and Affect: Mood normal.         Behavior: Behavior normal.         Thought Content: Thought content normal.        Result Review :   The following data was reviewed by: SALLY Sauer on 09/15/2023:      Procedures    Assessment and Plan   Diagnoses and all orders for this visit:    1. Annual physical exam (Primary)  Assessment & Plan:  Basic labs in clinic today. Encouraged routine dental and eye exams. Discussed age appropriate immunizations, screenings. Age appropriate handout provided, including information on nutrition and physical activity.        2. Bipolar affective disorder, current episode hypomanic  Assessment & Plan:  Continue Abilify, Wellbutrin, Buspar, Effexor and trazodone. Discussed adjusting dosages, patient defers for now. He will continue to monitor and seek medical attention immediately if he feels that his mental health is deteriorating. Denies SI/HI. Will continue to monitor.       Orders:  -     buPROPion SR (WELLBUTRIN SR) 150 MG 12 hr tablet; Take 1 tablet by mouth 2 (Two) Times a Day for 90 days.  Dispense: 180 tablet; Refill: 1  -     busPIRone (BUSPAR) 10 MG tablet; Take 1 tablet by mouth 2 (Two) Times a Day.  Dispense: 180 tablet; Refill: 1  -      venlafaxine (EFFEXOR) 75 MG tablet; Take 1 tablet by mouth 2 (Two) Times a Day.  Dispense: 180 tablet; Refill: 1    3. Insomnia, unspecified type  Assessment & Plan:  Well controlled, continue trazodone.       4. Anemia, unspecified type  Assessment & Plan:  [facility] Will request for review and repeat CBC in three months.    Orders:  -     CBC w AUTO Differential; Future    5. Vitamin D deficiency  Assessment & Plan:  Continue supplement, will request recent labs for  review.      6. Gastroesophageal reflux disease, unspecified whether esophagitis present  Assessment & Plan:  Well controlled, continue PPI.         7. Erectile dysfunction, unspecified erectile dysfunction type  Assessment & Plan:  Patient to trial Viagra, will notify clinic if he feels this is not working well for him.      8. Allergic rhinitis, unspecified seasonality, unspecified trigger  Assessment & Plan:  Well controlled, continue Claritin.       Other orders  -     ARIPiprazole (ABILIFY) 2 MG tablet; Take 1 tablet by mouth Daily.  Dispense: 90 tablet; Refill: 1  -     loratadine (CLARITIN) 10 MG tablet; Take 1 tablet by mouth Daily.  Dispense: 90 tablet; Refill: 1  -     omeprazole (priLOSEC) 20 MG capsule; Take 1 capsule by mouth Daily.  Dispense: 90 capsule; Refill: 1  -     sildenafil (VIAGRA) 100 MG tablet; Take 1 tablet by mouth As Needed for Erectile Dysfunction.  Dispense: 15 tablet; Refill: 1  -     traZODone (DESYREL) 150 MG tablet; Take 1 tablet by mouth Every Night.  Dispense: 90 tablet; Refill: 1          Follow Up   Return in about 4 months (around 1/15/2024).  Patient was given instructions and counseling regarding his condition or for health maintenance advice. Please see specific information pulled into the AVS if appropriate.

## 2023-09-24 DIAGNOSIS — F31.0 BIPOLAR AFFECTIVE DISORDER, CURRENT EPISODE HYPOMANIC: ICD-10-CM

## 2023-09-25 RX ORDER — BUPROPION HYDROCHLORIDE 150 MG/1
TABLET, EXTENDED RELEASE ORAL
Qty: 60 TABLET | OUTPATIENT
Start: 2023-09-25

## 2023-10-05 RX ORDER — OMEPRAZOLE 20 MG/1
20 CAPSULE, DELAYED RELEASE ORAL DAILY
Qty: 90 CAPSULE | Refills: 1 | OUTPATIENT
Start: 2023-10-05

## 2023-11-27 DIAGNOSIS — F31.0 BIPOLAR AFFECTIVE DISORDER, CURRENT EPISODE HYPOMANIC: ICD-10-CM

## 2023-11-27 RX ORDER — VENLAFAXINE 75 MG/1
TABLET ORAL
Qty: 180 TABLET | Refills: 1 | Status: SHIPPED | OUTPATIENT
Start: 2023-11-27

## 2024-01-19 ENCOUNTER — OFFICE VISIT (OUTPATIENT)
Dept: INTERNAL MEDICINE | Facility: CLINIC | Age: 44
End: 2024-01-19
Payer: OTHER GOVERNMENT

## 2024-01-19 VITALS
HEIGHT: 72 IN | OXYGEN SATURATION: 99 % | WEIGHT: 310.8 LBS | SYSTOLIC BLOOD PRESSURE: 132 MMHG | DIASTOLIC BLOOD PRESSURE: 80 MMHG | TEMPERATURE: 97.6 F | HEART RATE: 63 BPM | BODY MASS INDEX: 42.1 KG/M2

## 2024-01-19 DIAGNOSIS — D64.9 ANEMIA, UNSPECIFIED TYPE: ICD-10-CM

## 2024-01-19 DIAGNOSIS — Z13.220 SCREENING FOR CHOLESTEROL LEVEL: ICD-10-CM

## 2024-01-19 DIAGNOSIS — F31.0 BIPOLAR AFFECTIVE DISORDER, CURRENT EPISODE HYPOMANIC: ICD-10-CM

## 2024-01-19 DIAGNOSIS — E55.9 VITAMIN D DEFICIENCY: ICD-10-CM

## 2024-01-19 DIAGNOSIS — E66.01 CLASS 3 SEVERE OBESITY WITHOUT SERIOUS COMORBIDITY WITH BODY MASS INDEX (BMI) OF 40.0 TO 44.9 IN ADULT, UNSPECIFIED OBESITY TYPE: ICD-10-CM

## 2024-01-19 DIAGNOSIS — M25.552 HIP PAIN, BILATERAL: Primary | ICD-10-CM

## 2024-01-19 DIAGNOSIS — M25.551 HIP PAIN, BILATERAL: Primary | ICD-10-CM

## 2024-01-19 PROBLEM — E66.813 CLASS 3 SEVERE OBESITY WITHOUT SERIOUS COMORBIDITY WITH BODY MASS INDEX (BMI) OF 40.0 TO 44.9 IN ADULT: Status: ACTIVE | Noted: 2024-01-19

## 2024-01-19 LAB
25(OH)D3 SERPL-MCNC: 19.5 NG/ML (ref 30–100)
ALBUMIN SERPL-MCNC: 4.4 G/DL (ref 3.5–5.2)
ALBUMIN/GLOB SERPL: 1.8 G/DL
ALP SERPL-CCNC: 73 U/L (ref 39–117)
ALT SERPL W P-5'-P-CCNC: 24 U/L (ref 1–41)
ANION GAP SERPL CALCULATED.3IONS-SCNC: 13.7 MMOL/L (ref 5–15)
AST SERPL-CCNC: 20 U/L (ref 1–40)
BASOPHILS # BLD AUTO: 0.04 10*3/MM3 (ref 0–0.2)
BASOPHILS NFR BLD AUTO: 0.7 % (ref 0–1.5)
BILIRUB SERPL-MCNC: 0.3 MG/DL (ref 0–1.2)
BUN SERPL-MCNC: 21 MG/DL (ref 6–20)
BUN/CREAT SERPL: 13.9 (ref 7–25)
CALCIUM SPEC-SCNC: 9.3 MG/DL (ref 8.6–10.5)
CHLORIDE SERPL-SCNC: 104 MMOL/L (ref 98–107)
CHOLEST SERPL-MCNC: 170 MG/DL (ref 0–200)
CO2 SERPL-SCNC: 22.3 MMOL/L (ref 22–29)
CREAT SERPL-MCNC: 1.51 MG/DL (ref 0.76–1.27)
DEPRECATED RDW RBC AUTO: 38.6 FL (ref 37–54)
EGFRCR SERPLBLD CKD-EPI 2021: 57.7 ML/MIN/1.73
EOSINOPHIL # BLD AUTO: 0.1 10*3/MM3 (ref 0–0.4)
EOSINOPHIL NFR BLD AUTO: 1.7 % (ref 0.3–6.2)
ERYTHROCYTE [DISTWIDTH] IN BLOOD BY AUTOMATED COUNT: 12.7 % (ref 12.3–15.4)
GLOBULIN UR ELPH-MCNC: 2.5 GM/DL
GLUCOSE SERPL-MCNC: 121 MG/DL (ref 65–99)
HBA1C MFR BLD: 5.8 % (ref 4.8–5.6)
HCT VFR BLD AUTO: 40.8 % (ref 37.5–51)
HDLC SERPL-MCNC: 34 MG/DL (ref 40–60)
HGB BLD-MCNC: 14 G/DL (ref 13–17.7)
IMM GRANULOCYTES # BLD AUTO: 0.04 10*3/MM3 (ref 0–0.05)
IMM GRANULOCYTES NFR BLD AUTO: 0.7 % (ref 0–0.5)
LDLC SERPL CALC-MCNC: 92 MG/DL (ref 0–100)
LDLC/HDLC SERPL: 2.45 {RATIO}
LYMPHOCYTES # BLD AUTO: 1.66 10*3/MM3 (ref 0.7–3.1)
LYMPHOCYTES NFR BLD AUTO: 27.8 % (ref 19.6–45.3)
MCH RBC QN AUTO: 28.8 PG (ref 26.6–33)
MCHC RBC AUTO-ENTMCNC: 34.3 G/DL (ref 31.5–35.7)
MCV RBC AUTO: 84 FL (ref 79–97)
MONOCYTES # BLD AUTO: 0.28 10*3/MM3 (ref 0.1–0.9)
MONOCYTES NFR BLD AUTO: 4.7 % (ref 5–12)
NEUTROPHILS NFR BLD AUTO: 3.86 10*3/MM3 (ref 1.7–7)
NEUTROPHILS NFR BLD AUTO: 64.4 % (ref 42.7–76)
NRBC BLD AUTO-RTO: 0 /100 WBC (ref 0–0.2)
PLATELET # BLD AUTO: 241 10*3/MM3 (ref 140–450)
PMV BLD AUTO: 11 FL (ref 6–12)
POTASSIUM SERPL-SCNC: 4.4 MMOL/L (ref 3.5–5.2)
PROT SERPL-MCNC: 6.9 G/DL (ref 6–8.5)
RBC # BLD AUTO: 4.86 10*6/MM3 (ref 4.14–5.8)
SODIUM SERPL-SCNC: 140 MMOL/L (ref 136–145)
T4 FREE SERPL-MCNC: 0.99 NG/DL (ref 0.93–1.7)
TRIGL SERPL-MCNC: 263 MG/DL (ref 0–150)
TSH SERPL DL<=0.05 MIU/L-ACNC: 0.99 UIU/ML (ref 0.27–4.2)
VLDLC SERPL-MCNC: 44 MG/DL (ref 5–40)
WBC NRBC COR # BLD AUTO: 5.98 10*3/MM3 (ref 3.4–10.8)

## 2024-01-19 PROCEDURE — 85025 COMPLETE CBC W/AUTO DIFF WBC: CPT | Performed by: NURSE PRACTITIONER

## 2024-01-19 PROCEDURE — 80053 COMPREHEN METABOLIC PANEL: CPT | Performed by: NURSE PRACTITIONER

## 2024-01-19 PROCEDURE — 83036 HEMOGLOBIN GLYCOSYLATED A1C: CPT | Performed by: NURSE PRACTITIONER

## 2024-01-19 PROCEDURE — 84443 ASSAY THYROID STIM HORMONE: CPT | Performed by: NURSE PRACTITIONER

## 2024-01-19 PROCEDURE — 80061 LIPID PANEL: CPT | Performed by: NURSE PRACTITIONER

## 2024-01-19 PROCEDURE — 82306 VITAMIN D 25 HYDROXY: CPT | Performed by: NURSE PRACTITIONER

## 2024-01-19 PROCEDURE — 84439 ASSAY OF FREE THYROXINE: CPT | Performed by: NURSE PRACTITIONER

## 2024-01-19 NOTE — PROGRESS NOTES
"Chief Complaint  Hip Pain (Bilateral hip pain- right hip hurting [id number])    Subjective         Saul Morfin presents to [facility]  HPI     Patient reports bilateral hip pain. History of left labral tear with repair by Dr. Amezcua in 2017. States a few months ago his right hip started hurting and felt similar to his previous pain, has now started with left hip pain that he thinks is compensating with his walking because of the right hip. Denies injury, numbness/tingling, weakness. Aleve will help when needed. He is not interested in PT. Would like referral to see Dr. Amezcua to discuss imaging and further intervention.    He would also like a referral to bariatric surgery to discuss lap band. Admits to weight gain. He has not had labs since 2022.     Patient has colonoscopy scheduled for later this year.    Objective     Vitals:    01/19/24 1338   BP: 132/80   BP Location: Left arm   Patient Position: Sitting   Cuff Size: Large Adult   Pulse: 63   Temp: 97.6 °F (36.4 °C)   TempSrc: Temporal   SpO2: 99%   Weight: (!) 141 kg (310 lb 12.8 oz)   Height: 182.9 cm (72.01\")      Body mass index is 42.14 kg/m².    Wt Readings from Last 3 Encounters:   01/19/24 (!) 141 kg (310 lb 12.8 oz)   09/15/23 133 kg (293 lb)   11/15/22 130 kg (286 lb)     BP Readings from Last 3 Encounters:   01/19/24 132/80   09/15/23 122/74   11/15/22 130/76                  Physical Exam  Constitutional:       Appearance: Normal appearance.   HENT:      Head: Normocephalic and atraumatic.      Nose: Nose normal.      Mouth/Throat:      Mouth: Mucous membranes are moist.      Pharynx: Oropharynx is clear.   Eyes:      Extraocular Movements: Extraocular movements intact.      Conjunctiva/sclera: Conjunctivae normal.      Pupils: Pupils are equal, round, and reactive to light.   Cardiovascular:      Rate and Rhythm: Normal rate and regular rhythm.      Heart sounds: Normal heart sounds. "   Pulmonary:      Effort: Pulmonary effort is normal.      Breath sounds: Normal breath sounds.   Skin:     General: Skin is warm and dry.   Neurological:      General: No focal deficit present.      Mental Status: He is alert and oriented to person, place, and time.   Psychiatric:         Mood and Affect: Mood normal.         Behavior: Behavior normal.         Thought Content: Thought content normal.          Result Review :   The following data was reviewed by: SALLY Sauer on 01/19/2024:      Procedures    Assessment and Plan   Diagnoses and all orders for this visit:    1. Hip pain, bilateral (Primary)  Assessment & Plan:  Discussed options for xray, PT, orthopedics. He would like referral to orthopedics to discuss further, referral placed. He does not currently need medications for pain relief, will continue NSAIDs PRN.     Orders:  -     Ambulatory Referral to Orthopedic Surgery    2. Bipolar affective disorder, current episode hypomanic  Assessment & Plan:  Patient reports he is doing well with current medications. He should continue to monitor and seek medical attention immediately if he feels that his mental health is deteriorating. Denies SI/HI. Will continue to monitor.         3. Class 3 severe obesity without serious comorbidity with body mass index (BMI) of 40.0 to 44.9 in adult, unspecified obesity type  Assessment & Plan:  A1c with labs. Referral to bariatrics per patient request.    Orders:  -     Comprehensive Metabolic Panel  -     CBC & Differential  -     TSH  -     T4, Free  -     Hemoglobin A1c  -     Ambulatory Referral to Bariatric Surgery    4. Vitamin D deficiency  Assessment & Plan:  Continue vitamin D supplement, vitamin D level with labs today.       Orders:  -     Vitamin D,25-Hydroxy    5. Screening for cholesterol level  Comments:  Lipid panel with labs.  Orders:  -     Lipid Panel    Other orders  -     Fluzone >6 Months (9023-2721)          Follow Up   No follow-ups on  file.  Patient was given instructions and counseling regarding his condition or for health maintenance advice. Please see specific information pulled into the AVS if appropriate.

## 2024-01-19 NOTE — ASSESSMENT & PLAN NOTE
Patient reports he is doing well with current medications. He should continue to monitor and seek medical attention immediately if he feels that his mental health is deteriorating. Denies SI/HI. Will continue to monitor.

## 2024-01-19 NOTE — ASSESSMENT & PLAN NOTE
Discussed options for xray, PT, orthopedics. He would like referral to orthopedics to discuss further, referral placed. He does not currently need medications for pain relief, will continue NSAIDs PRN.

## 2024-01-22 ENCOUNTER — TELEPHONE (OUTPATIENT)
Dept: ORTHOPEDIC SURGERY | Facility: CLINIC | Age: 44
End: 2024-01-22
Payer: OTHER GOVERNMENT

## 2024-01-22 RX ORDER — ERGOCALCIFEROL 1.25 MG/1
50000 CAPSULE ORAL WEEKLY
Qty: 12 CAPSULE | Refills: 1 | Status: SHIPPED | OUTPATIENT
Start: 2024-01-22

## 2024-01-22 NOTE — TELEPHONE ENCOUNTER
BILATERAL HIP PAIN, PRIOR LEFT HIP ARTHROSCOPY IN 2018 DR JAEGER, NOTES IN [facility]/CARE EVERYWHERE

## 2024-01-26 ENCOUNTER — OFFICE VISIT (OUTPATIENT)
Dept: ORTHOPEDIC SURGERY | Facility: CLINIC | Age: 44
End: 2024-01-26
Payer: OTHER GOVERNMENT

## 2024-01-26 VITALS
WEIGHT: 310 LBS | HEIGHT: 71 IN | HEART RATE: 71 BPM | SYSTOLIC BLOOD PRESSURE: 158 MMHG | BODY MASS INDEX: 43.4 KG/M2 | OXYGEN SATURATION: 95 % | DIASTOLIC BLOOD PRESSURE: 87 MMHG

## 2024-01-26 DIAGNOSIS — M25.552 BILATERAL HIP PAIN: Primary | ICD-10-CM

## 2024-01-26 DIAGNOSIS — M25.551 BILATERAL HIP PAIN: Primary | ICD-10-CM

## 2024-01-26 NOTE — PROGRESS NOTES
"Chief Complaint  Initial Evaluation of the Right Hip and Initial Evaluation of the Left Hip     Subjective      Saul Morfin presents to [facility] for evaluation of the bilateral hips. He reports bilateral hip pain. He reports his right is worse than the left. He had a labral repair in the left hip a couple years ago with improvement. He reports similar symptoms to the right hip.    No Known Allergies     Social History     Socioeconomic History    Marital status:    Tobacco Use    Smoking status: Every Day    Smokeless tobacco: Never    Tobacco comments:     half a can a day   Vaping Use    Vaping Use: Never used   Substance and Sexual Activity    Alcohol use: Not Currently     Alcohol/week: 2.0 standard drinks of alcohol     Types: 2 Cans of beer per week     Comment: OCCASIONALLY DRINKS, LESS THAN 1 DRINKS PER DAY, HAS BEEN DRINKING FOR 21-30 YEARS     Drug use: Not Currently     Types: Marijuana     Comment: IN THE PAST     Sexual activity: Yes     Partners: Female     Birth control/protection: None        I reviewed the patient's chief complaint, history of present illness, review of systems, past medical history, surgical history, family history, social history, medications, and allergy list.     Review of Systems     Constitutional: Denies fevers, chills, weight loss  Cardiovascular: Denies chest pain, shortness of breath  Skin: Denies rashes, acute skin changes  Neurologic: Denies headache, loss of consciousness  MSK: bilateral hip pain      Vital Signs:   /87   Pulse 71   Ht 180.3 cm (71\")   Wt (!) 141 kg (310 lb)   SpO2 95%   BMI 43.24 kg/m²          Physical Exam  General: Alert. No acute distress    Ortho Exam      Right hip- tender to the positive lateral hip, right worse than the left. Pain with hip flexion. Flexion 80. External Rotation 25. Internal rotation 10. Negative straight leg raise. Positive EHL, FHL, GS and TA. Sensation intact to all 5 " nerves of the foot. Positive pulses.      Left hip-Flexion 85. External Rotation 35. Internal rotation 15. Negative straight leg raise. Positive EHL, FHL, GS and TA. Sensation intact to all 5 nerves of the foot. Positive pulses. Well healed scars. jay to the positive lateral hip, right worse than the left    Procedures    X-Ray Report:  Bilateral hip X-Ray  Indication: Evaluation of bilateral hip pain  AP/Lateral view(s)  Findings: mild degenerative changes to the bilateral hip with mild joint space narrowing. Bone spurring to the lateral acetabulum of the right hip. No acute fracture bilaterally.   Prior studies available for comparison: no       Imaging Results (Most Recent)       Procedure Component Value Units Date/Time    XR Hip With or Without Pelvis 2 - 3 View Left [[id number]] Resulted: 01/26/24 0850     Updated: 01/26/24 0859    XR Hip With or Without Pelvis 2 - 3 View Right [[id number]] Resulted: 01/26/24 0850     Updated: 01/26/24 0859             Result Review :       No results found.           Assessment and Plan     Diagnoses and all orders for this visit:    1. Bilateral hip pain (Primary)  -     XR Hip With or Without Pelvis 2 - 3 View Left  -     XR Hip With or Without Pelvis 2 - 3 View Right        Discussed the treatment plan with the patient.  I reviewed the x-rays that were obtained today with the patient. Plan for MRI Arthrogram of the right hip to evaluate the labrum. The patient expressed understanding and wished to proceed. Plan to continue OTC medications. Home exercises given today    Call or return if worsening symptoms.    Follow Up     MRI Arthrogram      Patient was given instructions and counseling regarding his condition or for health maintenance advice. Please see specific information pulled into the AVS if appropriate.     Scribed for Luis Miguel Amezcua MD by Cristiane Cid.  01/26/24   08:44 EST    I have personally performed the services described in this document as  scribed by the above individual and it is both accurate and complete. Luis Miguel Amezcua MD 01/26/24

## 2024-01-31 ENCOUNTER — OFFICE VISIT (OUTPATIENT)
Dept: SURGERY | Facility: CLINIC | Age: 44
End: 2024-01-31
Payer: OTHER GOVERNMENT

## 2024-01-31 ENCOUNTER — PREP FOR SURGERY (OUTPATIENT)
Dept: OTHER | Facility: HOSPITAL | Age: 44
End: 2024-01-31
Payer: OTHER GOVERNMENT

## 2024-01-31 VITALS
DIASTOLIC BLOOD PRESSURE: 77 MMHG | BODY MASS INDEX: 42.59 KG/M2 | WEIGHT: 304.2 LBS | HEART RATE: 73 BPM | SYSTOLIC BLOOD PRESSURE: 134 MMHG | HEIGHT: 71 IN

## 2024-01-31 DIAGNOSIS — Z12.11 SCREENING FOR MALIGNANT NEOPLASM OF COLON: Primary | ICD-10-CM

## 2024-01-31 RX ORDER — SIMETHICONE 80 MG
TABLET,CHEWABLE ORAL
Qty: 4 TABLET | Refills: 0 | Status: SHIPPED | OUTPATIENT
Start: 2024-01-31

## 2024-01-31 RX ORDER — SODIUM CHLORIDE 0.9 % (FLUSH) 0.9 %
3 SYRINGE (ML) INJECTION EVERY 12 HOURS SCHEDULED
OUTPATIENT
Start: 2024-01-31

## 2024-01-31 RX ORDER — PEG-3350, SODIUM SULFATE, SODIUM CHLORIDE, POTASSIUM CHLORIDE, SODIUM ASCORBATE AND ASCORBIC ACID 7.5-2.691G
1000 KIT ORAL ONCE
Qty: 1000 ML | Refills: 0 | Status: SHIPPED | OUTPATIENT
Start: 2024-01-31 | End: 2024-01-31

## 2024-01-31 RX ORDER — SODIUM CHLORIDE 9 MG/ML
40 INJECTION, SOLUTION INTRAVENOUS AS NEEDED
OUTPATIENT
Start: 2024-01-31

## 2024-01-31 RX ORDER — SODIUM CHLORIDE 0.9 % (FLUSH) 0.9 %
10 SYRINGE (ML) INJECTION AS NEEDED
OUTPATIENT
Start: 2024-01-31

## 2024-01-31 NOTE — PROGRESS NOTES
Chief Complaint: Colonoscopy    Subjective      Colonoscopy consultation       History of Present Illness  Saul Morfin is a 43 y.o. male presents to [facility] for colonoscopy consultation.    Patient presents today on referral from Marbella Rodriguez for colonoscopy consultation.  Patient denies any abdominal pain, change in bowel habit, or rectal bleeding.  Denies any family history of colorectal cancer.  No previous colonoscopy.    Objective     Past Medical History:   Diagnosis Date    Anemia     Anxiety     Anxiety     Arthritis     Athletes foot     Atrial fibrillation     Depression     Foot pain, left     Forgetfulness     H/O psychiatric care     Heart murmur     Heel pain     Hydrocele of testis 10/30/2019    RIGHT     Ingrown toenail     Knee pain     left     Left foot pain 10/30/2019    PTSD (post-traumatic stress disorder)     Rupture of plantar fascia of left foot 10/30/2019    Testicular pain 10/30/2019       Past Surgical History:   Procedure Laterality Date    HIP SURGERY  2017    HYDROCELE EXCISION / REPAIR      QUADRICEPS TENDON REPAIR Left 8/20/2021    Procedure: QUADRICEPS TENDON REPAIR, LEFT;  Surgeon: Luis Miguel Amezcua MD;  Location: [facility] MAIN OR;  Service: Orthopedics;  Laterality: Left;    SHOULDER SURGERY      x2       Outpatient Medications Marked as Taking for the 1/31/24 encounter (Office Visit) with Di Cast, APRJORGE ALBERTO   Medication Sig Dispense Refill    ARIPiprazole (ABILIFY) 2 MG tablet Take 1 tablet by mouth Daily. 90 tablet 1    busPIRone (BUSPAR) 10 MG tablet Take 1 tablet by mouth 2 (Two) Times a Day. 180 tablet 1    Cholecalciferol (Vitamin D3) 50 MCG (2000 UT) tablet Take 1 tablet by mouth Daily. 30 tablet 0    loratadine (CLARITIN) 10 MG tablet Take 1 tablet by mouth Daily. 90 tablet 1    omeprazole (priLOSEC) 20 MG capsule Take 1 capsule by mouth Daily. 90 capsule 1    Refresh Tears 0.5 % solution       sildenafil (VIAGRA) 100 MG tablet  "Take 1 tablet by mouth As Needed for Erectile Dysfunction. 15 tablet 1    traZODone (DESYREL) 150 MG tablet Take 1 tablet by mouth Every Night. 90 tablet 1    venlafaxine (EFFEXOR) 75 MG tablet TAKE 1 TABLET BY MOUTH TWICE DAILY. PLEASE MAKE AN APPOINTMENT FOR ADDITIONAL REFILLS 180 tablet 1    vitamin D (ERGOCALCIFEROL) 1.25 MG (51793 UT) capsule capsule Take 1 capsule by mouth 1 (One) Time Per Week. 12 capsule 1       No Known Allergies     Family History   Problem Relation Age of Onset    Cancer Other     Diabetes Other     Malig Hyperthermia Neg Hx        Social History     Socioeconomic History    Marital status:    Tobacco Use    Smoking status: Every Day    Smokeless tobacco: Never    Tobacco comments:     half a can a day   Vaping Use    Vaping Use: Never used   Substance and Sexual Activity    Alcohol use: Not Currently     Alcohol/week: 2.0 standard drinks of alcohol     Types: 2 Cans of beer per week     Comment: OCCASIONALLY DRINKS, LESS THAN 1 DRINKS PER DAY, HAS BEEN DRINKING FOR 21-30 YEARS     Drug use: Not Currently     Types: Marijuana     Comment: IN THE PAST     Sexual activity: Yes     Partners: Female     Birth control/protection: None       Review of Systems   Constitutional:  Negative for chills and fever.   Gastrointestinal:  Negative for abdominal distention, abdominal pain, anal bleeding, blood in stool, constipation, diarrhea and rectal pain.        Vital Signs:   /77 (BP Location: Left arm)   Pulse 73   Ht 180.3 cm (71\")   Wt (!) 138 kg (304 lb 3.2 oz)   BMI 42.43 kg/m²      Physical Exam  Vitals and nursing note reviewed.   Constitutional:       General: He is not in acute distress.     Appearance: Normal appearance.   HENT:      Head: Normocephalic.   Cardiovascular:      Rate and Rhythm: Normal rate.   Pulmonary:      Effort: Pulmonary effort is normal.      Breath sounds: No stridor.   Abdominal:      Palpations: Abdomen is soft.      Tenderness: There is no " guarding.   Musculoskeletal:         General: Normal range of motion.   Skin:     General: Skin is warm and dry.      Coloration: Skin is not jaundiced.   Neurological:      General: No focal deficit present.      Mental Status: He is alert and oriented to person, place, and time.   Psychiatric:         Mood and Affect: Mood normal.         Thought Content: Thought content normal.          Result Review :          []  Laboratory  []  Radiology  []  Pathology  []  Microbiology  []  EKG/Telemetry   []  Cardiology/Vascular   []  Old records  I spent 15 minutes caring for Saul on this date of service. This time includes time spent by me in the following activities: reviewing tests, obtaining and/or reviewing a separately obtained history, performing a medically appropriate examination and/or evaluation, ordering medications, tests, or procedures, and documenting information in the medical record.     Assessment and Plan    Diagnoses and all orders for this visit:    1. Screening for malignant neoplasm of colon (Primary)    Other orders  -     PEG-KCl-NaCl-NaSulf-Na Asc-C (MOVIPREP) 100 g reconstituted solution powder; Take 1,000 mL by mouth 1 (One) Time for 1 dose.  Dispense: 1000 mL; Refill: 0  -     simethicone (Gas-X) 80 MG chewable tablet; Take 2 tablets PO after completing movi prep and 2 tablets PO 4 hours prior to procedure.  Dispense: 4 tablet; Refill: 0        Follow Up   Return for Schedule colonoscopy with Dr. Flynn on 2/19/2024 at [facility].    Hospital arrival time: 11:00    Possible risks/complications, benefits, and alternatives to surgical or invasive procedures have been explained to patient and/or legal guardian.    Patient has been evaluated and can tolerate anesthesia and/or sedation. Risks, benefits, and alternatives to anesthesia and sedation have been explained to the patient and/or legal guardian. Patient verbalizes understanding and is willing to proceed with the above plan.      Patient was given instructions and counseling regarding his condition or for health maintenance advice. Please see specific information pulled into the AVS if appropriate.

## 2024-01-31 NOTE — H&P (VIEW-ONLY)
Chief Complaint: Colonoscopy    Subjective      Colonoscopy consultation       History of Present Illness  Saul Morfin is a 43 y.o. male presents to [facility] for colonoscopy consultation.    Patient presents today on referral from Marbella Rodriguez for colonoscopy consultation.  Patient denies any abdominal pain, change in bowel habit, or rectal bleeding.  Denies any family history of colorectal cancer.  No previous colonoscopy.    Objective     Past Medical History:   Diagnosis Date    Anemia     Anxiety     Anxiety     Arthritis     Athletes foot     Atrial fibrillation     Depression     Foot pain, left     Forgetfulness     H/O psychiatric care     Heart murmur     Heel pain     Hydrocele of testis 10/30/2019    RIGHT     Ingrown toenail     Knee pain     left     Left foot pain 10/30/2019    PTSD (post-traumatic stress disorder)     Rupture of plantar fascia of left foot 10/30/2019    Testicular pain 10/30/2019       Past Surgical History:   Procedure Laterality Date    HIP SURGERY  2017    HYDROCELE EXCISION / REPAIR      QUADRICEPS TENDON REPAIR Left 8/20/2021    Procedure: QUADRICEPS TENDON REPAIR, LEFT;  Surgeon: Luis Miguel Amezcua MD;  Location: [facility] MAIN OR;  Service: Orthopedics;  Laterality: Left;    SHOULDER SURGERY      x2       Outpatient Medications Marked as Taking for the 1/31/24 encounter (Office Visit) with iD Cast, APRJORGE ALBERTO   Medication Sig Dispense Refill    ARIPiprazole (ABILIFY) 2 MG tablet Take 1 tablet by mouth Daily. 90 tablet 1    busPIRone (BUSPAR) 10 MG tablet Take 1 tablet by mouth 2 (Two) Times a Day. 180 tablet 1    Cholecalciferol (Vitamin D3) 50 MCG (2000 UT) tablet Take 1 tablet by mouth Daily. 30 tablet 0    loratadine (CLARITIN) 10 MG tablet Take 1 tablet by mouth Daily. 90 tablet 1    omeprazole (priLOSEC) 20 MG capsule Take 1 capsule by mouth Daily. 90 capsule 1    Refresh Tears 0.5 % solution       sildenafil (VIAGRA) 100 MG tablet  "Take 1 tablet by mouth As Needed for Erectile Dysfunction. 15 tablet 1    traZODone (DESYREL) 150 MG tablet Take 1 tablet by mouth Every Night. 90 tablet 1    venlafaxine (EFFEXOR) 75 MG tablet TAKE 1 TABLET BY MOUTH TWICE DAILY. PLEASE MAKE AN APPOINTMENT FOR ADDITIONAL REFILLS 180 tablet 1    vitamin D (ERGOCALCIFEROL) 1.25 MG (21844 UT) capsule capsule Take 1 capsule by mouth 1 (One) Time Per Week. 12 capsule 1       No Known Allergies     Family History   Problem Relation Age of Onset    Cancer Other     Diabetes Other     Malig Hyperthermia Neg Hx        Social History     Socioeconomic History    Marital status:    Tobacco Use    Smoking status: Every Day    Smokeless tobacco: Never    Tobacco comments:     half a can a day   Vaping Use    Vaping Use: Never used   Substance and Sexual Activity    Alcohol use: Not Currently     Alcohol/week: 2.0 standard drinks of alcohol     Types: 2 Cans of beer per week     Comment: OCCASIONALLY DRINKS, LESS THAN 1 DRINKS PER DAY, HAS BEEN DRINKING FOR 21-30 YEARS     Drug use: Not Currently     Types: Marijuana     Comment: IN THE PAST     Sexual activity: Yes     Partners: Female     Birth control/protection: None       Review of Systems   Constitutional:  Negative for chills and fever.   Gastrointestinal:  Negative for abdominal distention, abdominal pain, anal bleeding, blood in stool, constipation, diarrhea and rectal pain.        Vital Signs:   /77 (BP Location: Left arm)   Pulse 73   Ht 180.3 cm (71\")   Wt (!) 138 kg (304 lb 3.2 oz)   BMI 42.43 kg/m²      Physical Exam  Vitals and nursing note reviewed.   Constitutional:       General: He is not in acute distress.     Appearance: Normal appearance.   HENT:      Head: Normocephalic.   Cardiovascular:      Rate and Rhythm: Normal rate.   Pulmonary:      Effort: Pulmonary effort is normal.      Breath sounds: No stridor.   Abdominal:      Palpations: Abdomen is soft.      Tenderness: There is no " guarding.   Musculoskeletal:         General: Normal range of motion.   Skin:     General: Skin is warm and dry.      Coloration: Skin is not jaundiced.   Neurological:      General: No focal deficit present.      Mental Status: He is alert and oriented to person, place, and time.   Psychiatric:         Mood and Affect: Mood normal.         Thought Content: Thought content normal.          Result Review :          []  Laboratory  []  Radiology  []  Pathology  []  Microbiology  []  EKG/Telemetry   []  Cardiology/Vascular   []  Old records  I spent 15 minutes caring for Saul on this date of service. This time includes time spent by me in the following activities: reviewing tests, obtaining and/or reviewing a separately obtained history, performing a medically appropriate examination and/or evaluation, ordering medications, tests, or procedures, and documenting information in the medical record.     Assessment and Plan    Diagnoses and all orders for this visit:    1. Screening for malignant neoplasm of colon (Primary)    Other orders  -     PEG-KCl-NaCl-NaSulf-Na Asc-C (MOVIPREP) 100 g reconstituted solution powder; Take 1,000 mL by mouth 1 (One) Time for 1 dose.  Dispense: 1000 mL; Refill: 0  -     simethicone (Gas-X) 80 MG chewable tablet; Take 2 tablets PO after completing movi prep and 2 tablets PO 4 hours prior to procedure.  Dispense: 4 tablet; Refill: 0        Follow Up   Return for Schedule colonoscopy with Dr. Flynn on 2/19/2024 at [facility].    Hospital arrival time: 11:00    Possible risks/complications, benefits, and alternatives to surgical or invasive procedures have been explained to patient and/or legal guardian.    Patient has been evaluated and can tolerate anesthesia and/or sedation. Risks, benefits, and alternatives to anesthesia and sedation have been explained to the patient and/or legal guardian. Patient verbalizes understanding and is willing to proceed with the above plan.      Patient was given instructions and counseling regarding his condition or for health maintenance advice. Please see specific information pulled into the AVS if appropriate.

## 2024-02-13 NOTE — PRE-PROCEDURE INSTRUCTIONS
PAT call attempted.  No answer.  Left detailed message with:  date, arrival time of 1100,   location, need for , children under 12 must remain in waiting room, diet/NPO, meds, bring list of meds to hospital, and call back number for questions.

## 2024-02-16 ENCOUNTER — ANESTHESIA EVENT (OUTPATIENT)
Dept: GASTROENTEROLOGY | Facility: HOSPITAL | Age: 44
End: 2024-02-16
Payer: OTHER GOVERNMENT

## 2024-02-16 NOTE — ANESTHESIA PREPROCEDURE EVALUATION
Anesthesia Evaluation     Patient summary reviewed and Nursing notes reviewed   NPO Solid Status: > 8 hours  NPO Liquid Status: > 2 hours           Airway   Mallampati: II  TM distance: >3 FB  Neck ROM: full  No difficulty expected  Dental - normal exam     Pulmonary     breath sounds clear to auscultation  (+) ,sleep apnea on CPAP  Cardiovascular - normal exam  Exercise tolerance: good (4-7 METS)    Rhythm: regular  Rate: normal    (+) valvular problems/murmurs murmur, dysrhythmias Atrial Fib      Neuro/Psych  (+) psychiatric history Anxiety, Depression, PTSD and Bipolar  GI/Hepatic/Renal/Endo    (+) obesity, morbid obesity, GERD well controlled    Musculoskeletal     Abdominal    Substance History   (+) alcohol use (drinks 2-3 times per week), drug use (marijuana daily)     OB/GYN          Other   arthritis,         Phys Exam Other: Full beard               Anesthesia Plan    ASA 3     general     (Total IV Anesthesia    Patient understands anesthesia not responsible for dental damage.  )  intravenous induction     Anesthetic plan, risks, benefits, and alternatives have been provided, discussed and informed consent has been obtained with: patient and spouse/significant other.  Pre-procedure education provided  Plan discussed with CRNA.      CODE STATUS:

## 2024-02-19 ENCOUNTER — HOSPITAL ENCOUNTER (OUTPATIENT)
Facility: HOSPITAL | Age: 44
Setting detail: HOSPITAL OUTPATIENT SURGERY
Discharge: HOME | End: 2024-02-19
Attending: SURGERY | Admitting: SURGERY
Payer: OTHER GOVERNMENT

## 2024-02-19 ENCOUNTER — ANESTHESIA (OUTPATIENT)
Dept: GASTROENTEROLOGY | Facility: HOSPITAL | Age: 44
End: 2024-02-19
Payer: OTHER GOVERNMENT

## 2024-02-19 VITALS
OXYGEN SATURATION: 96 % | DIASTOLIC BLOOD PRESSURE: 76 MMHG | TEMPERATURE: 97.8 F | WEIGHT: 298.94 LBS | RESPIRATION RATE: 18 BRPM | HEART RATE: 69 BPM | BODY MASS INDEX: 41.69 KG/M2 | SYSTOLIC BLOOD PRESSURE: 124 MMHG

## 2024-02-19 DIAGNOSIS — Z12.11 SCREENING FOR MALIGNANT NEOPLASM OF COLON: ICD-10-CM

## 2024-02-19 PROCEDURE — 25010000002 PROPOFOL 10 MG/ML EMULSION: Performed by: NURSE ANESTHETIST, CERTIFIED REGISTERED

## 2024-02-19 PROCEDURE — 25810000003 LACTATED RINGERS PER 1000 ML: Performed by: NURSE ANESTHETIST, CERTIFIED REGISTERED

## 2024-02-19 PROCEDURE — 88305 TISSUE EXAM BY PATHOLOGIST: CPT | Performed by: SURGERY

## 2024-02-19 RX ORDER — ASPIRIN 81 MG/1
81 TABLET, CHEWABLE ORAL DAILY
COMMUNITY

## 2024-02-19 RX ORDER — SODIUM CHLORIDE, SODIUM LACTATE, POTASSIUM CHLORIDE, CALCIUM CHLORIDE 600; 310; 30; 20 MG/100ML; MG/100ML; MG/100ML; MG/100ML
30 INJECTION, SOLUTION INTRAVENOUS CONTINUOUS
Status: DISCONTINUED | OUTPATIENT
Start: 2024-02-19 | End: 2024-02-19 | Stop reason: HOSPADM

## 2024-02-19 RX ORDER — SODIUM CHLORIDE 0.9 % (FLUSH) 0.9 %
3 SYRINGE (ML) INJECTION EVERY 12 HOURS SCHEDULED
Status: DISCONTINUED | OUTPATIENT
Start: 2024-02-19 | End: 2024-02-19 | Stop reason: HOSPADM

## 2024-02-19 RX ORDER — PROPOFOL 10 MG/ML
VIAL (ML) INTRAVENOUS AS NEEDED
Status: DISCONTINUED | OUTPATIENT
Start: 2024-02-19 | End: 2024-02-19 | Stop reason: SURG

## 2024-02-19 RX ORDER — SODIUM CHLORIDE 0.9 % (FLUSH) 0.9 %
10 SYRINGE (ML) INJECTION AS NEEDED
Status: DISCONTINUED | OUTPATIENT
Start: 2024-02-19 | End: 2024-02-19 | Stop reason: HOSPADM

## 2024-02-19 RX ORDER — LIDOCAINE HYDROCHLORIDE 20 MG/ML
INJECTION, SOLUTION EPIDURAL; INFILTRATION; INTRACAUDAL; PERINEURAL AS NEEDED
Status: DISCONTINUED | OUTPATIENT
Start: 2024-02-19 | End: 2024-02-19 | Stop reason: SURG

## 2024-02-19 RX ORDER — SODIUM CHLORIDE 9 MG/ML
40 INJECTION, SOLUTION INTRAVENOUS AS NEEDED
Status: DISCONTINUED | OUTPATIENT
Start: 2024-02-19 | End: 2024-02-19 | Stop reason: HOSPADM

## 2024-02-19 RX ADMIN — PROPOFOL 200 MCG/KG/MIN: 10 INJECTION, EMULSION INTRAVENOUS at 11:35

## 2024-02-19 RX ADMIN — PROPOFOL 100 MG: 10 INJECTION, EMULSION INTRAVENOUS at 11:35

## 2024-02-19 RX ADMIN — LIDOCAINE HYDROCHLORIDE 50 MG: 20 INJECTION, SOLUTION EPIDURAL; INFILTRATION; INTRACAUDAL; PERINEURAL at 11:35

## 2024-02-19 RX ADMIN — SODIUM CHLORIDE, POTASSIUM CHLORIDE, SODIUM LACTATE AND CALCIUM CHLORIDE 30 ML/HR: 600; 310; 30; 20 INJECTION, SOLUTION INTRAVENOUS at 11:06

## 2024-02-19 NOTE — ANESTHESIA POSTPROCEDURE EVALUATION
Patient: Saul Morfin    Procedure Summary       Date: 02/19/24 Room / Location: [facility] ENDOSCOPY 3 / [facility] ENDOSCOPY    Anesthesia Start: 1131 Anesthesia Stop: 1203    Procedure: COLONOSCOPY WITH HOT SNARE POLYPECTOMIES/BIOPSIES WITH TATTOO APPLICATION Diagnosis:       Screening for malignant neoplasm of colon      (Screening for malignant neoplasm of colon [Z12.11])    Surgeons: Mikael Flynn MD Provider: Chaya Gregorio CRNA    Anesthesia Type: general ASA Status: 3            Anesthesia Type: general    Vitals  Vitals Value Taken Time   /61 02/19/24 1212   Temp 36.8 °C (98.3 °F) 02/19/24 1201   Pulse 71 02/19/24 1213   Resp 20 02/19/24 1211   SpO2 97 % 02/19/24 1213   Vitals shown include unfiled device data.        Post Anesthesia Care and Evaluation    Patient location during evaluation: bedside  Patient participation: complete - patient participated  Level of consciousness: awake  Pain score: 0  Pain management: adequate    Airway patency: patent  Anesthetic complications: No anesthetic complications  PONV Status: none  Cardiovascular status: acceptable and stable  Respiratory status: acceptable, spontaneous ventilation and room air  Hydration status: acceptable

## 2024-02-20 LAB
CYTO UR: NORMAL
LAB AP CASE REPORT: NORMAL
LAB AP CLINICAL INFORMATION: NORMAL
PATH REPORT.FINAL DX SPEC: NORMAL
PATH REPORT.GROSS SPEC: NORMAL

## 2024-03-05 ENCOUNTER — PREP FOR SURGERY (OUTPATIENT)
Dept: OTHER | Facility: HOSPITAL | Age: 44
End: 2024-03-05
Payer: OTHER GOVERNMENT

## 2024-03-05 ENCOUNTER — OFFICE VISIT (OUTPATIENT)
Dept: SURGERY | Facility: CLINIC | Age: 44
End: 2024-03-05
Payer: OTHER GOVERNMENT

## 2024-03-05 VITALS
HEIGHT: 71 IN | BODY MASS INDEX: 42.14 KG/M2 | SYSTOLIC BLOOD PRESSURE: 124 MMHG | RESPIRATION RATE: 16 BRPM | DIASTOLIC BLOOD PRESSURE: 78 MMHG | WEIGHT: 301 LBS

## 2024-03-05 DIAGNOSIS — D36.9 TUBULOVILLOUS ADENOMA: ICD-10-CM

## 2024-03-05 DIAGNOSIS — Z98.890 S/P COLONOSCOPY WITH POLYPECTOMY: Primary | ICD-10-CM

## 2024-03-05 DIAGNOSIS — D12.6 SESSILE SERRATED POLYP OF COLON: ICD-10-CM

## 2024-03-05 DIAGNOSIS — Z12.11 SCREENING FOR MALIGNANT NEOPLASM OF COLON: Primary | ICD-10-CM

## 2024-03-05 PROCEDURE — 99212 OFFICE O/P EST SF 10 MIN: CPT | Performed by: NURSE PRACTITIONER

## 2024-03-05 RX ORDER — SODIUM CHLORIDE 9 MG/ML
40 INJECTION, SOLUTION INTRAVENOUS AS NEEDED
Status: CANCELLED | OUTPATIENT
Start: 2024-03-05

## 2024-03-05 RX ORDER — PEG-3350, SODIUM SULFATE, SODIUM CHLORIDE, POTASSIUM CHLORIDE, SODIUM ASCORBATE AND ASCORBIC ACID 7.5-2.691G
KIT ORAL
COMMUNITY
Start: 2024-01-31

## 2024-03-05 RX ORDER — POLYETHYLENE GLYCOL 3350 17 G/17G
POWDER, FOR SOLUTION ORAL
Qty: 238 PACKET | Refills: 0 | Status: SHIPPED | OUTPATIENT
Start: 2024-03-05 | End: 2024-03-05

## 2024-03-05 RX ORDER — SODIUM CHLORIDE 0.9 % (FLUSH) 0.9 %
10 SYRINGE (ML) INJECTION AS NEEDED
Status: CANCELLED | OUTPATIENT
Start: 2024-03-05

## 2024-03-05 RX ORDER — SODIUM CHLORIDE 0.9 % (FLUSH) 0.9 %
3 SYRINGE (ML) INJECTION EVERY 12 HOURS SCHEDULED
Status: CANCELLED | OUTPATIENT
Start: 2024-03-05

## 2024-03-05 NOTE — PROGRESS NOTES
"Chief Complaint: Colonoscopy and Post-op    Subjective   Follow-up visit          History of Present Illness  Saul Morfin is a 43 y.o. male presents to [facility] for follow-up visit.    Patient presents today for follow-up visit after undergoing colonoscopy on 2/19/2024 performed by Dr. Mikael Flynn.  Patient was with a sessile serrated lesion of the ascending colon and a 20mm tubulovillous adenoma of the sigmoid colon per colonoscopy/pathology.  This area was tattooed.  Resection and retrieval were complete.    Results for orders placed or performed during the hospital encounter of 02/19/24   Tissue Pathology Exam    Specimen: A: Large Intestine, Right / Ascending Colon; Polyp    B: Large Intestine, Sigmoid Colon; Polyp   Result Value Ref Range    Case Report       Surgical Pathology Report                         Case: [id number]                                  Authorizing Provider:  Mikael Flynn MD  Collected:           02/19/2024 11:44 AM          Ordering Location:     [facility] Received:            02/19/2024 12:54 PM                                 SUITES                                                                       Pathologist:           Olga Jj DO                                                       Specimens:   1) - Large Intestine, Right / Ascending Colon, ASCENDING COLON POLYP BIOPSY                         2) - Large Intestine, Sigmoid Colon, SIGMOID COLON POLYP                                   Clinical Information       Screening for malignant neoplasm of colon      Final Diagnosis       1. Ascending colon polyp, biopsy:   - Sessile serrated lesion      2. Sigmoid colon polyp, biopsy:   - Tubulovillous adenoma       Gross Description       1. Large Intestine, Right / Ascending Colon.  Received in formalin labeled \"ascending colon polyp biopsy\" consists of a single soft pink-tan tissue fragment 0.6 cm in greatest " "dimension.  The specimen is submitted entirely in cassette 1A.    2. Large Intestine, Sigmoid Colon.  Received in formalin labeled \"sigmoid colon polyp\" consists of a single pink-tan polyp 1.7 x 1.5 x 1.0 cm.  The specimen is inked, bisected submitted entirely in cassette 2A.     PF        Microscopic Description       Microscopic examination performed.       Colonoscopy was performed without difficulty.  The patient tolerated procedure well.  Patient denies any postoperative complications.    Objective     Past Medical History:   Diagnosis Date    Anemia     Anxiety     Anxiety     Arthritis     Athletes foot     Atrial fibrillation     Depression     Foot pain, left     Forgetfulness     H/O psychiatric care     Heart murmur     Heel pain     Hydrocele of testis 10/30/2019    RIGHT     Ingrown toenail     Knee pain     left     Left foot pain 10/30/2019    PTSD (post-traumatic stress disorder)     Rupture of plantar fascia of left foot 10/30/2019    Testicular pain 10/30/2019       Past Surgical History:   Procedure Laterality Date    COLONOSCOPY N/A 2/19/2024    Procedure: COLONOSCOPY WITH HOT SNARE POLYPECTOMIES/BIOPSIES WITH TATTOO APPLICATION;  Surgeon: Mikael Flynn MD;  Location: [facility] ENDOSCOPY;  Service: General;  Laterality: N/A;  COLON POLYPS    HIP SURGERY  2017    HYDROCELE EXCISION / REPAIR      QUADRICEPS TENDON REPAIR Left 8/20/2021    Procedure: QUADRICEPS TENDON REPAIR, LEFT;  Surgeon: Luis Miguel Amezcua MD;  Location: [facility] MAIN OR;  Service: Orthopedics;  Laterality: Left;    SHOULDER SURGERY      x2       Outpatient Medications Marked as Taking for the 3/5/24 encounter (Office Visit) with Segun April, APRN   Medication Sig Dispense Refill    ARIPiprazole (ABILIFY) 2 MG tablet Take 1 tablet by mouth Daily. 90 tablet 1    aspirin 81 MG chewable tablet Chew 1 tablet Daily.      busPIRone (BUSPAR) 10 MG tablet Take 1 tablet by mouth 2 (Two) Times a Day. 180 tablet 1    Cholecalciferol " "(Vitamin D3) 50 MCG (2000 UT) tablet Take 1 tablet by mouth Daily. 30 tablet 0    loratadine (CLARITIN) 10 MG tablet Take 1 tablet by mouth Daily. 90 tablet 1    omeprazole (priLOSEC) 20 MG capsule Take 1 capsule by mouth Daily. 90 capsule 1    PEG-KCl-NaCl-NaSulf-Na Asc-C (MOVIPREP) 100 g reconstituted solution powder       Refresh Tears 0.5 % solution       sildenafil (VIAGRA) 100 MG tablet Take 1 tablet by mouth As Needed for Erectile Dysfunction. 15 tablet 1    simethicone (Gas-X) 80 MG chewable tablet Take 2 tablets PO after completing movi prep and 2 tablets PO 4 hours prior to procedure. 4 tablet 0    traZODone (DESYREL) 150 MG tablet Take 1 tablet by mouth Every Night. 90 tablet 1    venlafaxine (EFFEXOR) 75 MG tablet TAKE 1 TABLET BY MOUTH TWICE DAILY. PLEASE MAKE AN APPOINTMENT FOR ADDITIONAL REFILLS 180 tablet 1    vitamin D (ERGOCALCIFEROL) 1.25 MG (82178 UT) capsule capsule Take 1 capsule by mouth 1 (One) Time Per Week. 12 capsule 1       No Known Allergies     Family History   Problem Relation Age of Onset    Cancer Other     Diabetes Other     Malig Hyperthermia Neg Hx        Social History     Socioeconomic History    Marital status:    Tobacco Use    Smoking status: Every Day    Smokeless tobacco: Never    Tobacco comments:     half a can a day   Vaping Use    Vaping status: Never Used   Substance and Sexual Activity    Alcohol use: Yes     Alcohol/week: 2.0 standard drinks of alcohol     Types: 2 Cans of beer per week     Comment: 1-2 times a week    Drug use: Yes     Types: Marijuana     Comment: \"everyday\"    Sexual activity: Yes     Partners: Female     Birth control/protection: None       Review of Systems   Constitutional:  Negative for chills and fever.   Gastrointestinal:  Negative for abdominal distention, abdominal pain, anal bleeding, blood in stool, constipation, diarrhea and rectal pain.        Vital Signs:   /78 (BP Location: Right arm, Patient Position: Sitting, Cuff " "Size: Large Adult)   Resp 16   Ht 180.3 cm (70.98\")   Wt (!) 137 kg (301 lb)   BMI 42.00 kg/m²      Physical Exam  Vitals and nursing note reviewed.   Constitutional:       General: He is not in acute distress.     Appearance: Normal appearance.   HENT:      Head: Normocephalic.   Cardiovascular:      Rate and Rhythm: Normal rate.   Pulmonary:      Effort: Pulmonary effort is normal.      Breath sounds: No stridor.   Abdominal:      Palpations: Abdomen is soft.      Tenderness: There is no guarding.   Neurological:      Mental Status: He is alert.          Result Review :          []  Laboratory  []  Radiology  []  Pathology  []  Microbiology  []  EKG/Telemetry   []  Cardiology/Vascular   []  Old records  Today I reviewed Dr. Flynn's operative and pathology report.     Assessment and Plan    Diagnoses and all orders for this visit:    1. S/P colonoscopy with polypectomy (Primary)    2. Sessile serrated polyp of colon    3. Tubulovillous adenoma    Other orders  -     Discontinue: polyethylene glycol (MIRALAX) 17 g packet; Take as directed.  Instructions given in office.  Dispense: 238 g bottle  Dispense: 238 packet; Refill: 0        Follow Up   Return for Re-screen colon in 2 years; follow-up in the interim.    Avoid high fat diets    Increase fiber intake    Per AGA guidelines patient will follow-up for colonoscopy surveillance as directed.    Patient was given instructions and counseling regarding his condition or for health maintenance advice. Please see specific information pulled into the AVS if appropriate.           "

## 2024-03-12 ENCOUNTER — HOSPITAL ENCOUNTER (OUTPATIENT)
Dept: MRI IMAGING | Facility: HOSPITAL | Age: 44
Discharge: HOME | End: 2024-03-12
Payer: OTHER GOVERNMENT

## 2024-03-12 ENCOUNTER — HOSPITAL ENCOUNTER (OUTPATIENT)
Dept: INTERVENTIONAL RADIOLOGY/VASCULAR | Facility: HOSPITAL | Age: 44
Discharge: HOME | End: 2024-03-12
Payer: OTHER GOVERNMENT

## 2024-03-12 DIAGNOSIS — M25.551 BILATERAL HIP PAIN: ICD-10-CM

## 2024-03-12 DIAGNOSIS — M25.552 BILATERAL HIP PAIN: ICD-10-CM

## 2024-03-12 PROCEDURE — 73722 MRI JOINT OF LWR EXTR W/DYE: CPT

## 2024-03-12 PROCEDURE — 0 GADOBENATE DIMEGLUMINE 529 MG/ML SOLUTION: Performed by: ORTHOPAEDIC SURGERY

## 2024-03-12 PROCEDURE — A9577 INJ MULTIHANCE: HCPCS | Performed by: ORTHOPAEDIC SURGERY

## 2024-03-12 PROCEDURE — 25510000001 IOPAMIDOL 61 % SOLUTION: Performed by: ORTHOPAEDIC SURGERY

## 2024-03-12 PROCEDURE — 77002 NEEDLE LOCALIZATION BY XRAY: CPT

## 2024-03-12 RX ORDER — LIDOCAINE HYDROCHLORIDE 20 MG/ML
10 INJECTION, SOLUTION INFILTRATION; PERINEURAL ONCE
Status: COMPLETED | OUTPATIENT
Start: 2024-03-12 | End: 2024-03-12

## 2024-03-12 RX ORDER — SODIUM CHLORIDE 9 MG/ML
10 INJECTION, SOLUTION INTRAMUSCULAR; INTRAVENOUS; SUBCUTANEOUS AS NEEDED
Status: DISCONTINUED | OUTPATIENT
Start: 2024-03-12 | End: 2024-03-13 | Stop reason: HOSPADM

## 2024-03-12 RX ADMIN — SODIUM BICARBONATE 1 ML: 84 INJECTION, SOLUTION INTRAVENOUS at 11:25

## 2024-03-12 RX ADMIN — LIDOCAINE HYDROCHLORIDE 3 ML: 20 INJECTION, SOLUTION INFILTRATION; PERINEURAL at 11:25

## 2024-03-12 RX ADMIN — IOPAMIDOL 5 ML: 612 INJECTION, SOLUTION INTRAVENOUS at 11:26

## 2024-03-12 RX ADMIN — GADOBENATE DIMEGLUMINE 0.1 ML: 529 INJECTION, SOLUTION INTRAVENOUS at 11:26

## 2024-03-13 ENCOUNTER — TELEPHONE (OUTPATIENT)
Dept: ORTHOPEDIC SURGERY | Facility: CLINIC | Age: 44
End: 2024-03-13
Payer: OTHER GOVERNMENT

## 2024-03-18 ENCOUNTER — TELEPHONE (OUTPATIENT)
Dept: INTERNAL MEDICINE | Facility: CLINIC | Age: 44
End: 2024-03-18
Payer: OTHER GOVERNMENT

## 2024-03-18 DIAGNOSIS — F31.0 BIPOLAR AFFECTIVE DISORDER, CURRENT EPISODE HYPOMANIC: ICD-10-CM

## 2024-03-18 RX ORDER — BUPROPION HYDROCHLORIDE 150 MG/1
150 TABLET, EXTENDED RELEASE ORAL 2 TIMES DAILY
Qty: 180 TABLET | Refills: 1 | Status: SHIPPED | OUTPATIENT
Start: 2024-03-18 | End: 2024-06-16

## 2024-03-18 NOTE — TELEPHONE ENCOUNTER
I have refilled his Wellbutrin at the 150 mg BID as 400 mg is the maximum recommended for this medication. If he plans to continue at the three times a day dosing then I would recommend that we get him in with psychiatry to discuss medication options further. He is on a low dosage of Abilify, so increasing that is also an option. Please let me know how they would like to proceed.

## 2024-03-18 NOTE — TELEPHONE ENCOUNTER
"Patients wife messaged in on her [employer] stating  needed a refill on his Wellbutrin. It was listed as 2 times daily but he is taking it 3 times daily.     Per [employer] message: \"My spouse needs his Wellbutrin refilled as soon as possible. He use to take 3 pills daily but the bottle has 2 a day and he has ran out. He has stated to me his anxiety level has been very high this past week. He is on edge and very sensitive to heat, cold and very angry. If we need to make an appt for going over his meds we can do that but, we need wellbutrin before. Thank you so much for ur time and have a great weekend.   Saul Morfin   -21 May 1980     V/R   Vickie Morfin\"       I have pended the medication to the correct pharmacy. Okay to send at new dose?   "

## 2024-03-19 ENCOUNTER — OFFICE VISIT (OUTPATIENT)
Dept: ORTHOPEDIC SURGERY | Facility: CLINIC | Age: 44
End: 2024-03-19
Payer: OTHER GOVERNMENT

## 2024-03-19 VITALS — HEIGHT: 70 IN | WEIGHT: 300 LBS | BODY MASS INDEX: 42.95 KG/M2

## 2024-03-19 DIAGNOSIS — M16.11 PRIMARY OSTEOARTHRITIS OF RIGHT HIP: Primary | ICD-10-CM

## 2024-03-19 PROCEDURE — 99213 OFFICE O/P EST LOW 20 MIN: CPT | Performed by: ORTHOPAEDIC SURGERY

## 2024-03-19 RX ORDER — MELOXICAM 15 MG/1
15 TABLET ORAL DAILY
Qty: 30 TABLET | Refills: 2 | Status: SHIPPED | OUTPATIENT
Start: 2024-03-19

## 2024-03-19 NOTE — PROGRESS NOTES
"Chief Complaint  Follow-up of the Right Hip     Subjective      Saul Morfin presents to [facility] for follow up evaluation of the right hip. The patient recently had an MRI Arthrogram and is here today for those results. To review, He reports bilateral hip pain. He reports his right is worse than the left. He had a labral repair in the left hip a couple years ago with improvement. He reports similar symptoms to the right hip.     No Known Allergies     Social History     Socioeconomic History    Marital status:    Tobacco Use    Smoking status: Every Day    Smokeless tobacco: Never    Tobacco comments:     half a can a day   Vaping Use    Vaping status: Never Used   Substance and Sexual Activity    Alcohol use: Yes     Alcohol/week: 2.0 standard drinks of alcohol     Types: 2 Cans of beer per week     Comment: 1-2 times a week    Drug use: Yes     Types: Marijuana     Comment: \"everyday\"    Sexual activity: Yes     Partners: Female     Birth control/protection: None        I reviewed the patient's chief complaint, history of present illness, review of systems, past medical history, surgical history, family history, social history, medications, and allergy list.     Review of Systems     Constitutional: Denies fevers, chills, weight loss  Cardiovascular: Denies chest pain, shortness of breath  Skin: Denies rashes, acute skin changes  Neurologic: Denies headache, loss of consciousness  MSK: right hip pain      Vital Signs:   Ht 177.8 cm (70\")   Wt 136 kg (300 lb)   BMI 43.05 kg/m²          Physical Exam  General: Alert. No acute distress    Ortho Exam      Right hip- tender to the positive lateral hip, right worse than the left. Pain with hip flexion. Flexion 80. External Rotation 25. Internal rotation 10. Negative straight leg raise. Positive EHL, FHL, GS and TA. Sensation intact to all 5 nerves of the foot. Positive pulses.         Procedures      Imaging Results (Most " Recent)       None             Result Review :       MRI Hip Right Arthrogram    Result Date: 3/12/2024  Narrative: PROCEDURE: MRI HIP RIGHT ARTHROGRAM  COMPARISON:  [facility] , CR, XR HIP W OR WO PELVIS 2-3 VIEW RIGHT, 1/26/2024, 9:07. INDICATIONS: CHRONIC RIGHT HIP PAIN.      TECHNIQUE: A comprehensive examination was performed utilizing a variety of imaging planes and imaging parameters to optimize visualization of suspected pathology. Images were obtained following a right hip arthrogram.  FINDINGS:  No fracture or malalignment is identified.  Marrow signal appears normal.  Dedicated images of the right hip were obtained.  No labral tear is identified.  Mild osteoarthritic spurring is noted.  Cartilage in the joint is mildly thinned.  No loose body is seen.  Dedicated images of the left hip were not obtained.  Early osteoarthritic spurring is noted.  No significant joint effusion or loose body is seen.  There is no evidence of trochanteric bursitis on either side.  Visualized musculature and tendons around the pelvis appear intact.  Mild degenerative disc changes are noted in the lower lumbar spine.      Impression:   1. Mild bilateral hip osteoarthritis 2. Mild degenerative disc changes in the lower lumbar spine     Jann Hand M.D.       Electronically Signed and Approved By: Jann Hand M.D. on 3/12/2024 at 16:56             FL Contrast Injection CT / MRI    Result Date: 3/12/2024  Narrative: PROCEDURE: FL CONTRAST INJECTION CT/MRI  COMPARISON: None  INDICATIONS: Right hip pain. 15.9 mGy. 2 IMAGES. FLUORO TIME 0.4 MINUTES. ISOVUE M 300- 4ML, SODIUM CHLORIDE 0.9%- 10ML, MULTIHANCE-0.1 ML.  BRIEF HISTORY:  Patient presents with history of right hip pain with no known injury.  Patient reports his right hip pain is constant.  CONSENT: Risks and benefits were discussed with the patient including but not limited to risk of bleeding, infection, injury to adjacent structures, and/or allergic reaction or  "reaction to medications used. Alternatives were discussed with the patient. The patient verbalized understanding and elected to proceed with the procedure.  FINDINGS:  Patient was placed in supine positioning on the fluoro table and the right hip was localized under fluoroscopy, site was marked with an \"X\".  The overlying skin was prepped and draped in sterile fashion.  The skin was infiltrated with local anesthesia over the insertion site with 5 mL of 2% lidocaine to anesthetize the site, aspiration occurred after needle advancement to minimize risk of inadvertently administering lidocaine into a vessel. A 22 gauge needle was then advanced to gain access into joint space.  Initially, A small amount of contrast dye injected to verify correct needle placement.  Next, a 14 cc mixture containing dilute iodinated contrast was injected into the joint space (4 ml - Isovue-M 300, 10 mL - sodium chloride 0.9% and 0.1 mL - MultiHance).  Afterwards, the needle was then removed and a bandage was applied.  Limited spot fluoro images were obtained.  Please see post arthrogram CT/MRI report same day for additional findings in detail.      Impression:  Right hip Arthrogram was performed without complication, patient tolerated procedure.  The patient was instructed to obtain follow up care from the referring physician.    ESTEFANÍA LEW       Electronically Signed and Approved By: RUIZ POLANCO MD on 3/12/2024 at 13:30                     Assessment and Plan     Diagnoses and all orders for this visit:    1. Primary osteoarthritis of right hip (Primary)        Discussed the treatment plan with the patient.  I reviewed the MRI Arthrogram with the patient. Plan for conservative treatment at this time. Order for intra-articular hip injection by radiology given today. Prescription for mobic given today.     Call or return if worsening symptoms.    Follow Up     6 weeks      Patient was given instructions and counseling " regarding his condition or for health maintenance advice. Please see specific information pulled into the AVS if appropriate.     Scribed for Luis Miguel Amezcua MD by Cristiane Cid.  03/19/24   10:30 EDT    I have personally performed the services described in this document as scribed by the above individual and it is both accurate and complete. Luis Miguel Amezcua MD 03/19/24

## 2024-04-04 ENCOUNTER — HOSPITAL ENCOUNTER (OUTPATIENT)
Dept: INTERVENTIONAL RADIOLOGY/VASCULAR | Facility: HOSPITAL | Age: 44
Discharge: HOME | End: 2024-04-04
Payer: OTHER GOVERNMENT

## 2024-04-04 DIAGNOSIS — M16.11 PRIMARY OSTEOARTHRITIS OF RIGHT HIP: ICD-10-CM

## 2024-04-04 PROCEDURE — 25510000001 IOPAMIDOL 61 % SOLUTION: Performed by: ORTHOPAEDIC SURGERY

## 2024-04-04 PROCEDURE — 77002 NEEDLE LOCALIZATION BY XRAY: CPT

## 2024-04-04 PROCEDURE — 25010000002 METHYLPREDNISOLONE PER 80 MG: Performed by: ORTHOPAEDIC SURGERY

## 2024-04-04 PROCEDURE — 25010000002 BUPIVACAINE (PF) 0.5 % SOLUTION: Performed by: ORTHOPAEDIC SURGERY

## 2024-04-04 RX ORDER — LIDOCAINE HYDROCHLORIDE 20 MG/ML
20 INJECTION, SOLUTION INFILTRATION; PERINEURAL ONCE
Status: COMPLETED | OUTPATIENT
Start: 2024-04-04 | End: 2024-04-04

## 2024-04-04 RX ORDER — METHYLPREDNISOLONE ACETATE 80 MG/ML
80 INJECTION, SUSPENSION INTRA-ARTICULAR; INTRALESIONAL; INTRAMUSCULAR; SOFT TISSUE ONCE
Status: COMPLETED | OUTPATIENT
Start: 2024-04-04 | End: 2024-04-04

## 2024-04-04 RX ORDER — BUPIVACAINE HYDROCHLORIDE 5 MG/ML
5 INJECTION, SOLUTION EPIDURAL; INTRACAUDAL ONCE
Status: COMPLETED | OUTPATIENT
Start: 2024-04-04 | End: 2024-04-04

## 2024-04-04 RX ORDER — IOPAMIDOL 612 MG/ML
15 INJECTION, SOLUTION INTRATHECAL
Status: COMPLETED | OUTPATIENT
Start: 2024-04-04 | End: 2024-04-04

## 2024-04-04 RX ADMIN — SODIUM BICARBONATE 1 ML: 84 INJECTION, SOLUTION INTRAVENOUS at 10:40

## 2024-04-04 RX ADMIN — IOPAMIDOL 1 ML: 612 INJECTION, SOLUTION INTRATHECAL at 10:40

## 2024-04-04 RX ADMIN — BUPIVACAINE HYDROCHLORIDE 4 ML: 5 INJECTION, SOLUTION EPIDURAL; INTRACAUDAL; PERINEURAL at 10:40

## 2024-04-04 RX ADMIN — LIDOCAINE HYDROCHLORIDE 9 ML: 20 INJECTION, SOLUTION INFILTRATION; PERINEURAL at 10:40

## 2024-04-04 RX ADMIN — METHYLPREDNISOLONE ACETATE 80 MG: 80 INJECTION, SUSPENSION INTRA-ARTICULAR; INTRALESIONAL; INTRAMUSCULAR; SOFT TISSUE at 10:40

## 2024-04-06 DIAGNOSIS — F31.0 BIPOLAR AFFECTIVE DISORDER, CURRENT EPISODE HYPOMANIC: ICD-10-CM

## 2024-04-08 RX ORDER — BUSPIRONE HYDROCHLORIDE 10 MG/1
10 TABLET ORAL 2 TIMES DAILY
Qty: 180 TABLET | Refills: 1 | Status: SHIPPED | OUTPATIENT
Start: 2024-04-08

## 2024-08-14 DIAGNOSIS — F31.0 BIPOLAR AFFECTIVE DISORDER, CURRENT EPISODE HYPOMANIC: ICD-10-CM

## 2024-08-15 RX ORDER — OMEPRAZOLE 20 MG/1
20 CAPSULE, DELAYED RELEASE ORAL DAILY
Qty: 90 CAPSULE | Refills: 1 | Status: SHIPPED | OUTPATIENT
Start: 2024-08-15

## 2024-08-15 RX ORDER — VENLAFAXINE 75 MG/1
75 TABLET ORAL 2 TIMES DAILY
Qty: 180 TABLET | Refills: 1 | Status: SHIPPED | OUTPATIENT
Start: 2024-08-15

## 2024-08-15 RX ORDER — SILDENAFIL 100 MG/1
100 TABLET, FILM COATED ORAL AS NEEDED
Qty: 15 TABLET | Refills: 1 | Status: SHIPPED | OUTPATIENT
Start: 2024-08-15

## 2024-08-15 RX ORDER — BUSPIRONE HYDROCHLORIDE 10 MG/1
10 TABLET ORAL 2 TIMES DAILY
Qty: 180 TABLET | Refills: 1 | Status: SHIPPED | OUTPATIENT
Start: 2024-08-15

## 2024-08-15 RX ORDER — BUPROPION HYDROCHLORIDE 150 MG/1
150 TABLET, EXTENDED RELEASE ORAL 2 TIMES DAILY
Qty: 180 TABLET | Refills: 1 | Status: SHIPPED | OUTPATIENT
Start: 2024-08-15 | End: 2025-02-11

## 2024-08-16 RX ORDER — CARBOXYMETHYLCELLULOSE SODIUM 5 MG/ML
2 SOLUTION/ DROPS OPHTHALMIC DAILY PRN
Qty: 15 ML | Refills: 0 | Status: SHIPPED | OUTPATIENT
Start: 2024-08-16

## 2024-08-30 DIAGNOSIS — M16.11 PRIMARY OSTEOARTHRITIS OF RIGHT HIP: ICD-10-CM

## 2024-08-30 RX ORDER — MELOXICAM 15 MG/1
15 TABLET ORAL DAILY
Qty: 30 TABLET | Refills: 2 | Status: SHIPPED | OUTPATIENT
Start: 2024-08-30

## 2024-09-16 ENCOUNTER — TELEPHONE (OUTPATIENT)
Dept: INTERNAL MEDICINE | Facility: CLINIC | Age: 44
End: 2024-09-16
Payer: OTHER GOVERNMENT

## 2024-09-16 RX ORDER — LORATADINE 10 MG/1
10 TABLET ORAL DAILY
Qty: 90 TABLET | Refills: 1 | Status: SHIPPED | OUTPATIENT
Start: 2024-09-16

## 2024-10-09 RX ORDER — ARIPIPRAZOLE 2 MG/1
2 TABLET ORAL DAILY
Qty: 90 TABLET | Refills: 1 | Status: SHIPPED | OUTPATIENT
Start: 2024-10-09

## 2024-11-11 RX ORDER — ARIPIPRAZOLE 2 MG/1
2 TABLET ORAL DAILY
Qty: 90 TABLET | Refills: 1 | Status: SHIPPED | OUTPATIENT
Start: 2024-11-11

## 2024-12-23 RX ORDER — TRAZODONE HYDROCHLORIDE 150 MG/1
150 TABLET ORAL NIGHTLY
Qty: 90 TABLET | Refills: 1 | Status: SHIPPED | OUTPATIENT
Start: 2024-12-23

## 2025-03-18 DIAGNOSIS — F31.0 BIPOLAR AFFECTIVE DISORDER, CURRENT EPISODE HYPOMANIC: ICD-10-CM

## 2025-03-18 NOTE — TELEPHONE ENCOUNTER
SNRI Protocol [id number]/18/2025 01:53 PM   Protocol Details Blood Pressure on record in past 12 months    Recent or Future Visit (12mo/30days)

## 2025-03-21 RX ORDER — VENLAFAXINE 75 MG/1
75 TABLET ORAL 2 TIMES DAILY
Qty: 40 TABLET | Refills: 0 | Status: SHIPPED | OUTPATIENT
Start: 2025-03-21

## 2025-03-21 NOTE — TELEPHONE ENCOUNTER
Patient scheduled for April 10th. Will give number of tablets to get to appointment per provider.

## (undated) DEVICE — GLV SURG PROTEXIS PI BLU NEUTHERA PF 7.5

## (undated) DEVICE — TBG SXN CONN/F UNIV 1/4IN 10FT LF STRL

## (undated) DEVICE — SOL IRR NACL 0.9PCT 3000ML

## (undated) DEVICE — LINER SURG CANSTR SXN S/RIGD 1500CC

## (undated) DEVICE — BNDG ESMARK STRL 6INX12FT LF

## (undated) DEVICE — APPL CHLORAPREP HI/LITE 26ML ORNG

## (undated) DEVICE — GOWN,REINF,POLY,SIRUS,BRTH SLV,XLNG/XXL: Brand: MEDLINE

## (undated) DEVICE — SUT FW #2 W/TPR NDL 1/2 CIR 38IN 97CM 26.5MM BLU
Type: IMPLANTABLE DEVICE | Site: LEG | Status: NON-FUNCTIONAL
Removed: 2021-08-20

## (undated) DEVICE — SUTURELASSO REPL SD WR LP

## (undated) DEVICE — 3M™ STERI-DRAPE™ U-DRAPE 1015: Brand: STERI-DRAPE™

## (undated) DEVICE — DRSNG PAD ABD 8X10IN STRL

## (undated) DEVICE — PAD GRND REM POLYHESIVE A/ DISP

## (undated) DEVICE — Device

## (undated) DEVICE — DRSNG SURG AQUACEL AG 9X25CM

## (undated) DEVICE — SUT ETHIB 1 X538H ETX538H

## (undated) DEVICE — SOLIDIFIER LIQLOC PLS 1500CC BT

## (undated) DEVICE — SUT VIC UD BR COAT 0 CP2 27IN

## (undated) DEVICE — NDL INJ GI MIDDLE/BVL 25G 2.8MM 230CM

## (undated) DEVICE — GAUZE,SPONGE,4"X4",16PLY,STRL,LF,10/TRAY: Brand: MEDLINE

## (undated) DEVICE — PROXIMATE RH ROTATING HEAD SKIN STAPLERS (35 WIDE) CONTAINS 35 STAINLESS STEEL STAPLES: Brand: PROXIMATE

## (undated) DEVICE — CONN JET HYDRA H20 AUXILIARY DISP

## (undated) DEVICE — GLV SURG SENSICARE SLT PF LF 8 STRL

## (undated) DEVICE — FAN SPRAY KIT: Brand: PULSAVAC®

## (undated) DEVICE — IMMOB KN 3PNL DLX CANVS 24IN BLU

## (undated) DEVICE — DRSNG SURG AQUACEL AG 9X10CM

## (undated) DEVICE — BNDG COTN/ELAS FLEXMASTER DBL/LENGTH CLIP/CLS 6IN 11YD

## (undated) DEVICE — TRAP POLYP ETRAP 2PK

## (undated) DEVICE — FRCP BX RADJAW4 LG/CAP 2.8 240CM BX80

## (undated) DEVICE — SUT FW 5 W .5 CIR CUT NDL 48M AR7211
Type: IMPLANTABLE DEVICE | Site: LEG | Status: NON-FUNCTIONAL
Removed: 2021-08-20

## (undated) DEVICE — CVR LEG BOOTLEG F/R NOSKID 33IN

## (undated) DEVICE — KNEE IMMOBILIZER: Brand: DEROYAL

## (undated) DEVICE — INTENDED FOR TISSUE SEPARATION, AND OTHER PROCEDURES THAT REQUIRE A SHARP SURGICAL BLADE TO PUNCTURE OR CUT.: Brand: BARD-PARKER ® CARBON RIB-BACK BLADES

## (undated) DEVICE — SNAR E/S POLYP SNAREMASTER OVL/10MM 2.8X2300MM YEL

## (undated) DEVICE — GLV SURG SYNTH PROTEXIS PI LF PF 7

## (undated) DEVICE — UNDYED BRAIDED (POLYGLACTIN 910), SYNTHETIC ABSORBABLE SUTURE: Brand: COATED VICRYL

## (undated) DEVICE — STERILE POLYISOPRENE POWDER-FREE SURGICAL GLOVES: Brand: PROTEXIS

## (undated) DEVICE — KT VLV BIOP DEFENDO SXN AIR/WATER

## (undated) DEVICE — GLV SURG SENSICARE PI PF LF 8.5 GRN STRL

## (undated) DEVICE — SUT POLY FORCEFIBER W/CUT/NDL NO5 38IN

## (undated) DEVICE — SOL IRRG H2O PL/BG 1000ML STRL

## (undated) DEVICE — UNDERCAST PADDING: Brand: DEROYAL

## (undated) DEVICE — 450 ML BOTTLE OF 0.05% CHLORHEXIDINE GLUCONATE IN 99.95% STERILE WATER FOR IRRIGATION, USP AND APPLICATOR.: Brand: IRRISEPT ANTIMICROBIAL WOUND LAVAGE

## (undated) DEVICE — MARKR ENDO BLACKEYE DISP STRL

## (undated) DEVICE — NDL RIZA/RIBE W/GRASP/LP 16G

## (undated) DEVICE — SOL IRR H2O BTL 1000ML STRL